# Patient Record
Sex: FEMALE | Race: WHITE | NOT HISPANIC OR LATINO
[De-identification: names, ages, dates, MRNs, and addresses within clinical notes are randomized per-mention and may not be internally consistent; named-entity substitution may affect disease eponyms.]

---

## 2018-01-25 ENCOUNTER — APPOINTMENT (OUTPATIENT)
Dept: GASTROENTEROLOGY | Facility: CLINIC | Age: 43
End: 2018-01-25
Payer: COMMERCIAL

## 2018-01-25 VITALS
WEIGHT: 228 LBS | HEART RATE: 94 BPM | RESPIRATION RATE: 14 BRPM | HEIGHT: 64 IN | DIASTOLIC BLOOD PRESSURE: 126 MMHG | SYSTOLIC BLOOD PRESSURE: 183 MMHG | BODY MASS INDEX: 38.93 KG/M2

## 2018-01-25 DIAGNOSIS — K62.5 HEMORRHAGE OF ANUS AND RECTUM: ICD-10-CM

## 2018-01-25 DIAGNOSIS — Z78.9 OTHER SPECIFIED HEALTH STATUS: ICD-10-CM

## 2018-01-25 DIAGNOSIS — Z83.79 FAMILY HISTORY OF OTHER DISEASES OF THE DIGESTIVE SYSTEM: ICD-10-CM

## 2018-01-25 DIAGNOSIS — Z80.0 FAMILY HISTORY OF MALIGNANT NEOPLASM OF DIGESTIVE ORGANS: ICD-10-CM

## 2018-01-25 PROCEDURE — 99214 OFFICE O/P EST MOD 30 MIN: CPT

## 2018-01-25 PROCEDURE — 82272 OCCULT BLD FECES 1-3 TESTS: CPT

## 2018-01-26 LAB
ALBUMIN SERPL ELPH-MCNC: 4.4 G/DL
ALP BLD-CCNC: 74 U/L
ALT SERPL-CCNC: 15 U/L
ANION GAP SERPL CALC-SCNC: 17 MMOL/L
AST SERPL-CCNC: 13 U/L
BASOPHILS # BLD AUTO: 0.04 K/UL
BASOPHILS NFR BLD AUTO: 0.4 %
BILIRUB SERPL-MCNC: <0.2 MG/DL
BUN SERPL-MCNC: 10 MG/DL
CALCIUM SERPL-MCNC: 9.5 MG/DL
CHLORIDE SERPL-SCNC: 102 MMOL/L
CO2 SERPL-SCNC: 22 MMOL/L
CREAT SERPL-MCNC: 0.83 MG/DL
EOSINOPHIL # BLD AUTO: 0.08 K/UL
EOSINOPHIL NFR BLD AUTO: 0.9 %
GLUCOSE SERPL-MCNC: 82 MG/DL
HCT VFR BLD CALC: 41.7 %
HGB BLD-MCNC: 13.5 G/DL
IMM GRANULOCYTES NFR BLD AUTO: 0.3 %
LYMPHOCYTES # BLD AUTO: 2.59 K/UL
LYMPHOCYTES NFR BLD AUTO: 28.2 %
MAN DIFF?: NORMAL
MCHC RBC-ENTMCNC: 29.2 PG
MCHC RBC-ENTMCNC: 32.4 GM/DL
MCV RBC AUTO: 90.3 FL
MONOCYTES # BLD AUTO: 0.68 K/UL
MONOCYTES NFR BLD AUTO: 7.4 %
NEUTROPHILS # BLD AUTO: 5.75 K/UL
NEUTROPHILS NFR BLD AUTO: 62.8 %
PLATELET # BLD AUTO: 351 K/UL
POTASSIUM SERPL-SCNC: 3.9 MMOL/L
PROT SERPL-MCNC: 7.1 G/DL
RBC # BLD: 4.62 M/UL
RBC # FLD: 13.4 %
SODIUM SERPL-SCNC: 141 MMOL/L
WBC # FLD AUTO: 9.17 K/UL

## 2018-02-15 ENCOUNTER — APPOINTMENT (OUTPATIENT)
Dept: GASTROENTEROLOGY | Facility: GI CENTER | Age: 43
End: 2018-02-15
Payer: COMMERCIAL

## 2018-02-15 ENCOUNTER — OUTPATIENT (OUTPATIENT)
Dept: OUTPATIENT SERVICES | Facility: HOSPITAL | Age: 43
LOS: 1 days | End: 2018-02-15
Payer: COMMERCIAL

## 2018-02-15 DIAGNOSIS — Z80.0 FAMILY HISTORY OF MALIGNANT NEOPLASM OF DIGESTIVE ORGANS: ICD-10-CM

## 2018-02-15 PROCEDURE — 45378 DIAGNOSTIC COLONOSCOPY: CPT

## 2018-07-27 PROBLEM — Z80.0 FAMILY HISTORY OF COLON CANCER: Status: ACTIVE | Noted: 2018-01-25

## 2018-09-07 ENCOUNTER — APPOINTMENT (OUTPATIENT)
Dept: CARDIOLOGY | Facility: CLINIC | Age: 43
End: 2018-09-07
Payer: COMMERCIAL

## 2018-09-07 VITALS — SYSTOLIC BLOOD PRESSURE: 152 MMHG | DIASTOLIC BLOOD PRESSURE: 100 MMHG

## 2018-09-07 VITALS
SYSTOLIC BLOOD PRESSURE: 134 MMHG | HEART RATE: 92 BPM | BODY MASS INDEX: 40.63 KG/M2 | HEIGHT: 64 IN | RESPIRATION RATE: 16 BRPM | DIASTOLIC BLOOD PRESSURE: 80 MMHG | OXYGEN SATURATION: 97 % | WEIGHT: 238 LBS

## 2018-09-07 VITALS — DIASTOLIC BLOOD PRESSURE: 98 MMHG | SYSTOLIC BLOOD PRESSURE: 149 MMHG

## 2018-09-07 DIAGNOSIS — Z78.9 OTHER SPECIFIED HEALTH STATUS: ICD-10-CM

## 2018-09-07 DIAGNOSIS — G45.3 AMAUROSIS FUGAX: ICD-10-CM

## 2018-09-07 PROCEDURE — 93000 ELECTROCARDIOGRAM COMPLETE: CPT

## 2018-09-07 PROCEDURE — 99205 OFFICE O/P NEW HI 60 MIN: CPT

## 2018-09-10 ENCOUNTER — RESULT CHARGE (OUTPATIENT)
Age: 43
End: 2018-09-10

## 2018-09-10 LAB
ALBUMIN SERPL ELPH-MCNC: 4 G/DL
ALP BLD-CCNC: 64 U/L
ALT SERPL-CCNC: 18 U/L
ANION GAP SERPL CALC-SCNC: 15 MMOL/L
APO B SERPL-MCNC: 98.8 MG/DL
AST SERPL-CCNC: 14 U/L
BILIRUB SERPL-MCNC: 0.2 MG/DL
BUN SERPL-MCNC: 11 MG/DL
CALCIUM SERPL-MCNC: 9.3 MG/DL
CHLORIDE SERPL-SCNC: 105 MMOL/L
CHOLEST SERPL-MCNC: 180 MG/DL
CHOLEST/HDLC SERPL: 5.1 RATIO
CO2 SERPL-SCNC: 21 MMOL/L
CREAT SERPL-MCNC: 0.64 MG/DL
GLUCOSE SERPL-MCNC: 116 MG/DL
HDLC SERPL-MCNC: 35 MG/DL
LDLC SERPL CALC-MCNC: 112 MG/DL
POTASSIUM SERPL-SCNC: 3.8 MMOL/L
PROT SERPL-MCNC: 6.8 G/DL
SODIUM SERPL-SCNC: 141 MMOL/L
TRIGL SERPL-MCNC: 163 MG/DL
TSH SERPL-ACNC: 1.6 UIU/ML

## 2018-09-12 LAB — APO LP(A) SERPL-MCNC: <10 NMOL/L

## 2018-09-18 ENCOUNTER — APPOINTMENT (OUTPATIENT)
Dept: CARDIOLOGY | Facility: CLINIC | Age: 43
End: 2018-09-18
Payer: COMMERCIAL

## 2018-09-18 PROCEDURE — 93970 EXTREMITY STUDY: CPT

## 2018-09-18 PROCEDURE — 93880 EXTRACRANIAL BILAT STUDY: CPT

## 2018-09-18 PROCEDURE — 93306 TTE W/DOPPLER COMPLETE: CPT

## 2018-09-19 ENCOUNTER — APPOINTMENT (OUTPATIENT)
Dept: CARDIOLOGY | Facility: CLINIC | Age: 43
End: 2018-09-19
Payer: COMMERCIAL

## 2018-09-19 VITALS
HEIGHT: 64 IN | HEART RATE: 77 BPM | DIASTOLIC BLOOD PRESSURE: 77 MMHG | BODY MASS INDEX: 40.63 KG/M2 | OXYGEN SATURATION: 94 % | WEIGHT: 238 LBS | SYSTOLIC BLOOD PRESSURE: 113 MMHG

## 2018-09-19 PROCEDURE — 99214 OFFICE O/P EST MOD 30 MIN: CPT

## 2018-10-17 ENCOUNTER — APPOINTMENT (OUTPATIENT)
Dept: CARDIOLOGY | Facility: CLINIC | Age: 43
End: 2018-10-17

## 2018-10-17 ENCOUNTER — APPOINTMENT (OUTPATIENT)
Dept: CARDIOLOGY | Facility: CLINIC | Age: 43
End: 2018-10-17
Payer: COMMERCIAL

## 2018-10-17 ENCOUNTER — NON-APPOINTMENT (OUTPATIENT)
Age: 43
End: 2018-10-17

## 2018-10-17 VITALS
WEIGHT: 238 LBS | HEART RATE: 87 BPM | BODY MASS INDEX: 40.63 KG/M2 | DIASTOLIC BLOOD PRESSURE: 82 MMHG | OXYGEN SATURATION: 96 % | SYSTOLIC BLOOD PRESSURE: 132 MMHG | HEIGHT: 64 IN

## 2018-10-17 PROCEDURE — 99215 OFFICE O/P EST HI 40 MIN: CPT

## 2018-10-17 PROCEDURE — G0447 BEHAVIOR COUNSEL OBESITY 15M: CPT | Mod: XU

## 2018-10-17 PROCEDURE — 93000 ELECTROCARDIOGRAM COMPLETE: CPT

## 2018-10-26 ENCOUNTER — APPOINTMENT (OUTPATIENT)
Dept: CARDIOLOGY | Facility: CLINIC | Age: 43
End: 2018-10-26
Payer: COMMERCIAL

## 2018-10-26 PROCEDURE — 93015 CV STRESS TEST SUPVJ I&R: CPT

## 2018-12-19 ENCOUNTER — APPOINTMENT (OUTPATIENT)
Dept: CARDIOLOGY | Facility: CLINIC | Age: 43
End: 2018-12-19

## 2019-01-07 ENCOUNTER — MEDICATION RENEWAL (OUTPATIENT)
Age: 44
End: 2019-01-07

## 2019-01-30 ENCOUNTER — APPOINTMENT (OUTPATIENT)
Dept: CARDIOLOGY | Facility: CLINIC | Age: 44
End: 2019-01-30
Payer: COMMERCIAL

## 2019-01-30 VITALS
HEART RATE: 84 BPM | WEIGHT: 238 LBS | BODY MASS INDEX: 40.63 KG/M2 | HEIGHT: 64 IN | OXYGEN SATURATION: 97 % | SYSTOLIC BLOOD PRESSURE: 118 MMHG | DIASTOLIC BLOOD PRESSURE: 83 MMHG

## 2019-01-30 DIAGNOSIS — M79.89 OTHER SPECIFIED SOFT TISSUE DISORDERS: ICD-10-CM

## 2019-01-30 PROCEDURE — 93000 ELECTROCARDIOGRAM COMPLETE: CPT

## 2019-01-30 PROCEDURE — 99215 OFFICE O/P EST HI 40 MIN: CPT

## 2019-01-30 NOTE — DISCUSSION/SUMMARY
[Patient] : the patient [Risks] : risks [Benefits] : benefits [Alternatives] : alternatives [___ Month(s)] : [unfilled] month(s) [With Me] : with me [FreeTextEntry1] : This is a 43 year old woman withe headaches, left arm numbness with dyspnea on exertion\par 1) hypertension controled.   ct norvasc 5, valsartan 160 and HCTZ 25 mg daily.  \par 2) dyspnea on exertion: Stress echo. 2d echo. BP contol\par 3) Left arm numbness: Carotid US. mild atheroscelrosis. Diet and exercise Bp control. \par 4) LEft leg cramping: US venous.\par 5) LDL : 112.  Hold choleterol meds. Exercise diet. stop Atorvastatin. \par 6) venous insufficiency:  Reflux in legs patient stands for long hours. States her job makes her stand. \par compression stockings not covered by insurance. \par \par 6) Intensive behavioral counselling performed. Calculated BMI =  41 kg/msq.  TIME spent > 15 mins. Diet assessment shows high calorie  and fat intake. Intensive behavioral counselling and behavior therapy performed for diet and exercise.  Exercise prescription given. Encouraged to join fitness center. Cardiac rehab offered/not covered.   Nutriton referral given. Offered weight loss medication.  ordered belviq 10 mg Q12. \par Assessed risk behaviors.  Explained about effects of such behavior on cardiac health and weight. Information and prescription handed in paper. Patient agrees for nutritional counselling and to establish a diet plan and scheduled exercise. Mental Based Stress reduction training given to reduce stress.  will follow up next visit on progress.

## 2019-01-30 NOTE — PHYSICAL EXAM
[General Appearance - Well Developed] : well developed [Normal Appearance] : normal appearance [Well Groomed] : well groomed [General Appearance - Well Nourished] : well nourished [No Deformities] : no deformities [General Appearance - In No Acute Distress] : no acute distress [Normal Conjunctiva] : the conjunctiva exhibited no abnormalities [Eyelids - No Xanthelasma] : the eyelids demonstrated no xanthelasmas [Normal Oral Mucosa] : normal oral mucosa [No Oral Pallor] : no oral pallor [No Oral Cyanosis] : no oral cyanosis [Normal Jugular Venous A Waves Present] : normal jugular venous A waves present [Normal Jugular Venous V Waves Present] : normal jugular venous V waves present [No Jugular Venous Rose A Waves] : no jugular venous rose A waves [Respiration, Rhythm And Depth] : normal respiratory rhythm and effort [Exaggerated Use Of Accessory Muscles For Inspiration] : no accessory muscle use [Auscultation Breath Sounds / Voice Sounds] : lungs were clear to auscultation bilaterally [Heart Rate And Rhythm] : heart rate and rhythm were normal [Heart Sounds] : normal S1 and S2 [Murmurs] : no murmurs present [Abdomen Soft] : soft [Abdomen Tenderness] : non-tender [Abdomen Mass (___ Cm)] : no abdominal mass palpated [Abnormal Walk] : normal gait [Gait - Sufficient For Exercise Testing] : the gait was sufficient for exercise testing [Nail Clubbing] : no clubbing of the fingernails [Cyanosis, Localized] : no localized cyanosis [Petechial Hemorrhages (___cm)] : no petechial hemorrhages [Skin Color & Pigmentation] : normal skin color and pigmentation [] : no rash [No Venous Stasis] : no venous stasis [Skin Lesions] : no skin lesions [No Skin Ulcers] : no skin ulcer [No Xanthoma] : no  xanthoma was observed [Oriented To Time, Place, And Person] : oriented to person, place, and time [Affect] : the affect was normal [Mood] : the mood was normal [No Anxiety] : not feeling anxious

## 2019-01-30 NOTE — REASON FOR VISIT
[Initial Evaluation] : an initial evaluation of [FreeTextEntry2] : dyspnea on exertion, left leg cramp, and difficulty in vision [FreeTextEntry1] : dyspnea on exertion, left leg cramp, and difficulty in vision

## 2019-01-30 NOTE — CARDIOLOGY SUMMARY
[No Ischemia] : no Ischemia [No Exercise Ind Arr] : no exercise induced arrhythmias [No Symptoms] : no Symptoms [___] : [unfilled] [LVEF ___%] : LVEF [unfilled]% [Normal] : normal LA size [None] : no mitral regurgitation [de-identified] : US carotid: midl atherosclerosis. no stenosis

## 2019-01-30 NOTE — REVIEW OF SYSTEMS
[Fever] : no fever [Headache] : headache [Chills] : no chills [Feeling Fatigued] : feeling fatigued [see HPI] : see HPI [Blurry Vision] : blurred vision [Seeing Double (Diplopia)] : no diplopia [Eye Pain] : eye pain [Eyeglasses] : not currently wearing eyeglasses [Shortness Of Breath] : shortness of breath [Dyspnea on exertion] : dyspnea during exertion [Chest  Pressure] : no chest pressure [Chest Pain] : no chest pain [Lower Ext Edema] : no extremity edema [Leg Claudication] : no intermittent leg claudication [Palpitations] : no palpitations [Cough] : no cough [Abdominal Pain] : no abdominal pain [Nausea] : no nausea [Vomiting] : no vomiting [Heartburn] : no heartburn [Change in Appetite] : no change in appetite [Change In The Stool] : no change in stool [Dysphagia] : no dysphagia [Dysuria] : no dysuria [Itching] : no itching [Dizziness] : dizziness [Tremor] : no tremor was seen [Memory Lapses Or Loss] : no memory lapses or loss [Negative] : Heme/Lymph

## 2019-01-30 NOTE — HISTORY OF PRESENT ILLNESS
[FreeTextEntry1] : dyspnea on exertion, left leg cramp, and difficulty in vision\par  patient dd not lose weight\par she feels better with BP meds. migraine headches.  headaches improved significantly. \par \par \par \par old note: fels ok. No chest pain. fatigue and headaches gone. Saw the result of venous doppler. Reflux in leg.s p[atient stands for long houors. 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 compression stockins \par \par old note: this is a 43 year old woman with left side numbness and heaviness and dyspnea one xertion.\par Patient states she has had elevated blood pressure in [facility] and other physicians.  She never took medications. She thought it was stress.  last few mths she has been having high blood pressure and not feeling right. \par She has been having fatigue and tiredness. she has been having headaches. she said one episode of left leg cramping. Also she has been having vision problems now last 3 mths.  \par the other day on Wednesdays she was at the beach and she had sudden onset of left side numbness and heaviness and pain in the legs and numbness. \par She has been having dyspnea on exertion for a while. going up the stairs and sweating. \par

## 2019-01-31 ENCOUNTER — OTHER (OUTPATIENT)
Age: 44
End: 2019-01-31

## 2019-03-06 ENCOUNTER — OTHER (OUTPATIENT)
Age: 44
End: 2019-03-06

## 2019-03-06 ENCOUNTER — CLINICAL ADVICE (OUTPATIENT)
Age: 44
End: 2019-03-06

## 2019-06-05 ENCOUNTER — NON-APPOINTMENT (OUTPATIENT)
Age: 44
End: 2019-06-05

## 2019-06-05 ENCOUNTER — APPOINTMENT (OUTPATIENT)
Dept: CARDIOLOGY | Facility: CLINIC | Age: 44
End: 2019-06-05
Payer: COMMERCIAL

## 2019-06-05 VITALS
HEART RATE: 92 BPM | DIASTOLIC BLOOD PRESSURE: 79 MMHG | SYSTOLIC BLOOD PRESSURE: 125 MMHG | OXYGEN SATURATION: 98 % | HEIGHT: 64 IN

## 2019-06-05 DIAGNOSIS — R12 HEARTBURN: ICD-10-CM

## 2019-06-05 PROCEDURE — 99215 OFFICE O/P EST HI 40 MIN: CPT

## 2019-06-05 PROCEDURE — 93000 ELECTROCARDIOGRAM COMPLETE: CPT

## 2019-06-05 NOTE — REVIEW OF SYSTEMS
[Headache] : headache [Fever] : no fever [Feeling Fatigued] : feeling fatigued [Chills] : no chills [see HPI] : see HPI [Blurry Vision] : blurred vision [Eye Pain] : eye pain [Seeing Double (Diplopia)] : no diplopia [Eyeglasses] : not currently wearing eyeglasses [Dyspnea on exertion] : dyspnea during exertion [Shortness Of Breath] : shortness of breath [Chest Pain] : no chest pain [Chest  Pressure] : no chest pressure [Leg Claudication] : no intermittent leg claudication [Lower Ext Edema] : no extremity edema [Abdominal Pain] : no abdominal pain [Palpitations] : no palpitations [Cough] : no cough [Nausea] : no nausea [Vomiting] : no vomiting [Heartburn] : no heartburn [Change in Appetite] : no change in appetite [Dysphagia] : no dysphagia [Change In The Stool] : no change in stool [Dizziness] : dizziness [Dysuria] : no dysuria [Itching] : no itching [Tremor] : no tremor was seen [Memory Lapses Or Loss] : no memory lapses or loss [Negative] : Heme/Lymph

## 2019-06-05 NOTE — CARDIOLOGY SUMMARY
[No Ischemia] : no Ischemia [No Symptoms] : no Symptoms [No Exercise Ind Arr] : no exercise induced arrhythmias [___] : [unfilled] [LVEF ___%] : LVEF [unfilled]% [Normal] : normal LA size [None] : no mitral regurgitation [de-identified] : US carotid: midl atherosclerosis. no stenosis

## 2019-06-05 NOTE — PHYSICAL EXAM
[General Appearance - Well Developed] : well developed [Well Groomed] : well groomed [Normal Appearance] : normal appearance [General Appearance - Well Nourished] : well nourished [No Deformities] : no deformities [General Appearance - In No Acute Distress] : no acute distress [Normal Conjunctiva] : the conjunctiva exhibited no abnormalities [Eyelids - No Xanthelasma] : the eyelids demonstrated no xanthelasmas [Normal Oral Mucosa] : normal oral mucosa [No Oral Pallor] : no oral pallor [No Oral Cyanosis] : no oral cyanosis [Normal Jugular Venous A Waves Present] : normal jugular venous A waves present [Normal Jugular Venous V Waves Present] : normal jugular venous V waves present [No Jugular Venous Rose A Waves] : no jugular venous rose A waves [Exaggerated Use Of Accessory Muscles For Inspiration] : no accessory muscle use [Respiration, Rhythm And Depth] : normal respiratory rhythm and effort [Heart Rate And Rhythm] : heart rate and rhythm were normal [Auscultation Breath Sounds / Voice Sounds] : lungs were clear to auscultation bilaterally [Murmurs] : no murmurs present [Heart Sounds] : normal S1 and S2 [Abdomen Soft] : soft [Abdomen Mass (___ Cm)] : no abdominal mass palpated [Abdomen Tenderness] : non-tender [Gait - Sufficient For Exercise Testing] : the gait was sufficient for exercise testing [Abnormal Walk] : normal gait [Petechial Hemorrhages (___cm)] : no petechial hemorrhages [Cyanosis, Localized] : no localized cyanosis [Nail Clubbing] : no clubbing of the fingernails [Skin Color & Pigmentation] : normal skin color and pigmentation [] : no ischemic changes [Skin Lesions] : no skin lesions [No Venous Stasis] : no venous stasis [No Xanthoma] : no  xanthoma was observed [No Skin Ulcers] : no skin ulcer [Oriented To Time, Place, And Person] : oriented to person, place, and time [Affect] : the affect was normal [No Anxiety] : not feeling anxious [Mood] : the mood was normal

## 2019-06-05 NOTE — HISTORY OF PRESENT ILLNESS
[FreeTextEntry1] : dyspnea on exertion, left leg cramp, and difficulty in vision\par \par state her varicose veins of right leg burst. she c/o pulsatile mass on the leg that is intermittent. \par she has chest pain intermittent. not on exertion.\par Also has some heart burn. \par \par old note:  patient dd not lose weight\par she feels better with BP meds. migraine headches.  headaches improved significantly. \par \par \par \par old note: fels ok. No chest pain. fatigue and headaches gone. Saw the result of venous doppler. Reflux in leg.s p[atient stands for long houors. 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  She never took medications. She thought it was stress.  last few mths she has been having high blood pressure and not feeling right. \par She has been having fatigue and tiredness. she has been having headaches. she said one episode of left leg cramping. Also she has been having vision problems now last 3 mths.  \par the other day on Wednesdays she was at the beach and she had sudden onset of left side numbness and heaviness and pain in the legs and numbness. \par She has been having dyspnea on exertion for a while. going up the stairs and sweating. \par

## 2019-06-05 NOTE — DISCUSSION/SUMMARY
[Patient] : the patient [Risks] : risks [Benefits] : benefits [___ Month(s)] : [unfilled] month(s) [Alternatives] : alternatives [With Me] : with me [FreeTextEntry1] : This is a 43 year old woman withe headaches, left arm numbness with dyspnea on exertion\par 1) chest pain: recent stress test. no ischemia. Likely heart burn. protonix 40 mg daily before break fast. if continues to have symptoms, then repeat stress test. \par 1) hypertension controled.   ct norvasc 5, valsartan 160 and HCTZ 25 mg daily.  \par 2) dyspnea on exertion: Stress echo. 2d echo. BP contol\par 3) Left arm numbness: Carotid US. mild atheroscelrosis. Diet and exercise Bp control. \par 4) LDL : 112.  Hold choleterol meds. Exercise diet. stop Atorvastatin. \par 5) venous insufficiency:  Reflux in legs patient stands for long hours. States her job makes her stand. \par compression stockings not covered by insurance. \par 6) Intensive behavioral counselling performed. Calculated BMI =  41 kg/msq.  TIME spent > 15 mins. she did not take belviq.

## 2019-06-20 ENCOUNTER — RX CHANGE (OUTPATIENT)
Age: 44
End: 2019-06-20

## 2019-12-11 ENCOUNTER — NON-APPOINTMENT (OUTPATIENT)
Age: 44
End: 2019-12-11

## 2019-12-11 ENCOUNTER — APPOINTMENT (OUTPATIENT)
Dept: CARDIOLOGY | Facility: CLINIC | Age: 44
End: 2019-12-11
Payer: COMMERCIAL

## 2019-12-11 VITALS
SYSTOLIC BLOOD PRESSURE: 143 MMHG | DIASTOLIC BLOOD PRESSURE: 83 MMHG | HEART RATE: 91 BPM | OXYGEN SATURATION: 96 % | RESPIRATION RATE: 16 BRPM

## 2019-12-11 VITALS — HEIGHT: 64 IN | BODY MASS INDEX: 40.97 KG/M2 | WEIGHT: 240 LBS

## 2019-12-11 DIAGNOSIS — F32.9 MAJOR DEPRESSIVE DISORDER, SINGLE EPISODE, UNSPECIFIED: ICD-10-CM

## 2019-12-11 PROCEDURE — 93000 ELECTROCARDIOGRAM COMPLETE: CPT

## 2019-12-11 PROCEDURE — 99215 OFFICE O/P EST HI 40 MIN: CPT | Mod: 25

## 2019-12-11 PROCEDURE — 99401 PREV MED CNSL INDIV APPRX 15: CPT

## 2019-12-11 RX ORDER — LORCASERIN HYDROCHLORIDE HEMIHYDRATE 10 MG/1
10 TABLET ORAL TWICE DAILY
Qty: 60 | Refills: 0 | Status: DISCONTINUED | COMMUNITY
Start: 2019-01-30 | End: 2019-12-11

## 2019-12-11 RX ORDER — HYDROCORTISONE 25 MG/G
2.5 CREAM TOPICAL TWICE DAILY
Qty: 30 | Refills: 3 | Status: DISCONTINUED | COMMUNITY
Start: 2018-02-15 | End: 2019-12-11

## 2019-12-11 RX ORDER — ATORVASTATIN CALCIUM 10 MG/1
10 TABLET, FILM COATED ORAL
Qty: 30 | Refills: 5 | Status: DISCONTINUED | COMMUNITY
Start: 2018-09-07 | End: 2019-12-11

## 2019-12-11 RX ORDER — IBUPROFEN 200 MG/1
200 TABLET, FILM COATED ORAL EVERY 6 HOURS
Refills: 0 | Status: DISCONTINUED | COMMUNITY
End: 2019-12-11

## 2019-12-11 RX ORDER — PANTOPRAZOLE 40 MG/1
40 TABLET, DELAYED RELEASE ORAL DAILY
Qty: 30 | Refills: 1 | Status: DISCONTINUED | COMMUNITY
Start: 2019-06-05 | End: 2019-12-11

## 2019-12-11 RX ORDER — HYDROCORTISONE ACETATE 25 MG/1
25 SUPPOSITORY RECTAL TWICE DAILY
Qty: 60 | Refills: 3 | Status: DISCONTINUED | COMMUNITY
Start: 2018-01-25 | End: 2019-12-11

## 2019-12-11 NOTE — PHYSICAL EXAM
[Normal Appearance] : normal appearance [General Appearance - Well Developed] : well developed [Well Groomed] : well groomed [General Appearance - Well Nourished] : well nourished [No Deformities] : no deformities [General Appearance - In No Acute Distress] : no acute distress [Normal Conjunctiva] : the conjunctiva exhibited no abnormalities [No Oral Pallor] : no oral pallor [Normal Oral Mucosa] : normal oral mucosa [Eyelids - No Xanthelasma] : the eyelids demonstrated no xanthelasmas [Normal Jugular Venous V Waves Present] : normal jugular venous V waves present [Normal Jugular Venous A Waves Present] : normal jugular venous A waves present [No Oral Cyanosis] : no oral cyanosis [No Jugular Venous Rose A Waves] : no jugular venous rose A waves [Respiration, Rhythm And Depth] : normal respiratory rhythm and effort [Heart Rate And Rhythm] : heart rate and rhythm were normal [Auscultation Breath Sounds / Voice Sounds] : lungs were clear to auscultation bilaterally [Exaggerated Use Of Accessory Muscles For Inspiration] : no accessory muscle use [Heart Sounds] : normal S1 and S2 [Abdomen Soft] : soft [Murmurs] : no murmurs present [Abdomen Tenderness] : non-tender [Abdomen Mass (___ Cm)] : no abdominal mass palpated [Abnormal Walk] : normal gait [Nail Clubbing] : no clubbing of the fingernails [Cyanosis, Localized] : no localized cyanosis [Gait - Sufficient For Exercise Testing] : the gait was sufficient for exercise testing [Skin Color & Pigmentation] : normal skin color and pigmentation [Petechial Hemorrhages (___cm)] : no petechial hemorrhages [Skin Lesions] : no skin lesions [] : no rash [No Venous Stasis] : no venous stasis [No Xanthoma] : no  xanthoma was observed [No Skin Ulcers] : no skin ulcer [Oriented To Time, Place, And Person] : oriented to person, place, and time [Mood] : the mood was normal [Affect] : the affect was normal [No Anxiety] : not feeling anxious

## 2019-12-11 NOTE — DISCUSSION/SUMMARY
[Risks] : risks [Patient] : the patient [Alternatives] : alternatives [___ Month(s)] : [unfilled] month(s) [Benefits] : benefits [With Me] : with me [FreeTextEntry1] : This is a 43 year old woman withe headaches, left arm numbness with dyspnea on exertion\par 1) chest pain: recent stress test. no ischemia. Likely heart burn. protonix 40 mg daily before break fast. if continues to have symptoms, then repeat stress test. \par 1) hypertension controled.   ct norvasc 5, valsartan 160 and HCTZ 25 mg daily.  \par 2) dyspnea on exertion: Stress echo. 2d echo. BP contol\par 3) Left arm numbness: Carotid US. mild atheroscelrosis. Diet and exercise Bp control. \par 4) LDL : 112.  Hold choleterol meds. Exercise diet. stop Atorvastatin. \par 5) venous insufficiency:  Reflux in legs  \par compression stockings not covered by insurance. \par 6) Intensive behavioral counselling performed. Calculated BMI =  41 kg/msq.  TIME spent > 15 mins. she did not take belviq. Intensive behavioral counselling performed.    Diet assessment shows high calorie  and fat intake. Intensive behavioral counselling and behavior therapy performed for diet and exercise.  Exercise prescription given. Encouraged to join fitness center.     Nutriton referral given. Offered weight loss medication.  Qsmia (Phentermine/topiramate)/ Belviq (Locaserin), Contrave (naltrexone/bupropion)  or victoza (liraglutide- DM med)\par Assessed risk behaviors.  Explained about effects of such behavior on cardiac health and weight. Information and prescription handed in paper. Patient agrees for nutritional counselling and to establish a diet plan and scheduled exercise. Mental Based Stress reduction training given to reduce stress.  will follow up next visit on progress. \par patient does not want to take meds. Patient does not want to seek bariatric surgery. she does not want to take weight loss meds. \par we discussed all options. suggest hypnotherapy. will try metformin,.

## 2019-12-11 NOTE — REVIEW OF SYSTEMS
[Headache] : headache [see HPI] : see HPI [Feeling Fatigued] : feeling fatigued [Blurry Vision] : blurred vision [Eye Pain] : eye pain [Dyspnea on exertion] : dyspnea during exertion [Shortness Of Breath] : shortness of breath [Dizziness] : dizziness [Negative] : Heme/Lymph [Fever] : no fever [Eyeglasses] : not currently wearing eyeglasses [Seeing Double (Diplopia)] : no diplopia [Chills] : no chills [Lower Ext Edema] : no extremity edema [Chest Pain] : no chest pain [Chest  Pressure] : no chest pressure [Palpitations] : no palpitations [Leg Claudication] : no intermittent leg claudication [Cough] : no cough [Nausea] : no nausea [Abdominal Pain] : no abdominal pain [Vomiting] : no vomiting [Change In The Stool] : no change in stool [Change in Appetite] : no change in appetite [Heartburn] : no heartburn [Dysphagia] : no dysphagia [Itching] : no itching [Dysuria] : no dysuria [Tremor] : no tremor was seen [Memory Lapses Or Loss] : no memory lapses or loss

## 2019-12-11 NOTE — CARDIOLOGY SUMMARY
[No Ischemia] : no Ischemia [No Exercise Ind Arr] : no exercise induced arrhythmias [No Symptoms] : no Symptoms [___] : [unfilled] [LVEF ___%] : LVEF [unfilled]% [Normal] : normal LA size [None] : no mitral regurgitation [de-identified] : US carotid: midl atherosclerosis. no stenosis

## 2019-12-11 NOTE — HISTORY OF PRESENT ILLNESS
[FreeTextEntry1] : dyspnea on exertion, left leg cramp, and difficulty in vision\par \par HPI for today: : gained weight.  no chest pain. no headaches. no dizziness. no chest pain and no dyspnea.  no palpitations. \par \par old note: state her varicose veins of right leg burst. she c/o pulsatile mass on the leg that is intermittent. \par she has chest pain intermittent. not on exertion.\par Also has some heart burn. \par \par old note:  patient dd not lose weight\par she feels better with BP meds. migraine headches.  headaches improved significantly. \par \par \par \par

## 2020-01-02 ENCOUNTER — MEDICATION RENEWAL (OUTPATIENT)
Age: 45
End: 2020-01-02

## 2020-01-03 ENCOUNTER — RX CHANGE (OUTPATIENT)
Age: 45
End: 2020-01-03

## 2020-07-22 ENCOUNTER — APPOINTMENT (OUTPATIENT)
Dept: CARDIOLOGY | Facility: CLINIC | Age: 45
End: 2020-07-22
Payer: COMMERCIAL

## 2020-07-22 VITALS
HEART RATE: 91 BPM | SYSTOLIC BLOOD PRESSURE: 118 MMHG | TEMPERATURE: 97.6 F | DIASTOLIC BLOOD PRESSURE: 79 MMHG | WEIGHT: 235 LBS | OXYGEN SATURATION: 96 % | BODY MASS INDEX: 40.34 KG/M2

## 2020-07-22 DIAGNOSIS — I87.2 VENOUS INSUFFICIENCY (CHRONIC) (PERIPHERAL): ICD-10-CM

## 2020-07-22 DIAGNOSIS — R40.0 SOMNOLENCE: ICD-10-CM

## 2020-07-22 PROCEDURE — 93000 ELECTROCARDIOGRAM COMPLETE: CPT

## 2020-07-22 PROCEDURE — 99214 OFFICE O/P EST MOD 30 MIN: CPT

## 2020-07-28 ENCOUNTER — NON-APPOINTMENT (OUTPATIENT)
Age: 45
End: 2020-07-28

## 2020-08-14 ENCOUNTER — NON-APPOINTMENT (OUTPATIENT)
Age: 45
End: 2020-08-14

## 2020-08-27 DIAGNOSIS — Z01.818 ENCOUNTER FOR OTHER PREPROCEDURAL EXAMINATION: ICD-10-CM

## 2020-08-29 ENCOUNTER — APPOINTMENT (OUTPATIENT)
Dept: DISASTER EMERGENCY | Facility: CLINIC | Age: 45
End: 2020-08-29

## 2020-09-01 ENCOUNTER — OUTPATIENT (OUTPATIENT)
Dept: OUTPATIENT SERVICES | Facility: HOSPITAL | Age: 45
LOS: 1 days | End: 2020-09-01
Payer: COMMERCIAL

## 2020-09-01 DIAGNOSIS — G47.33 OBSTRUCTIVE SLEEP APNEA (ADULT) (PEDIATRIC): ICD-10-CM

## 2020-09-01 PROCEDURE — 95806 SLEEP STUDY UNATT&RESP EFFT: CPT | Mod: 26

## 2020-09-01 PROCEDURE — 95806 SLEEP STUDY UNATT&RESP EFFT: CPT

## 2020-09-01 PROCEDURE — G0399: CPT

## 2020-10-29 ENCOUNTER — APPOINTMENT (OUTPATIENT)
Dept: PULMONOLOGY | Facility: CLINIC | Age: 45
End: 2020-10-29
Payer: COMMERCIAL

## 2020-10-29 VITALS
DIASTOLIC BLOOD PRESSURE: 80 MMHG | HEIGHT: 64.5 IN | BODY MASS INDEX: 40.47 KG/M2 | WEIGHT: 240 LBS | OXYGEN SATURATION: 98 % | HEART RATE: 84 BPM | SYSTOLIC BLOOD PRESSURE: 130 MMHG

## 2020-10-29 PROCEDURE — 99072 ADDL SUPL MATRL&STAF TM PHE: CPT

## 2020-10-29 PROCEDURE — 99204 OFFICE O/P NEW MOD 45 MIN: CPT | Mod: 25

## 2020-10-29 RX ORDER — METFORMIN HYDROCHLORIDE 500 MG/1
500 TABLET, COATED ORAL
Qty: 60 | Refills: 3 | Status: DISCONTINUED | COMMUNITY
Start: 2019-12-11 | End: 2020-10-29

## 2020-10-29 NOTE — HISTORY OF PRESENT ILLNESS
[Obstructive Sleep Apnea] : obstructive sleep apnea [Awakes Unrefreshed] : awakes unrefreshed [Awakes with Headache] : awakes with headache [Daytime Somnolence] : daytime somnolence [Hypersomnolence] : hypersomnolence [Snoring] : snoring [TextBox_77] : 9Pm [TextBox_79] : 9am [TextBox_81] : 60 [TextBox_93] : restless sleeper

## 2020-10-29 NOTE — DISCUSSION/SUMMARY
[Obstructive Sleep Apnea] : obstructive sleep apnea [Mild] : mild [CPAP Titration] : CPAP titration [None] : There are no changes in medication management [Alcohol Avoidance] : alcohol avoidance [Sedative Avoidance] : sedative avoidance [Weight Loss Program] : weight loss program [de-identified] : Possible OA. Pt disinterested in CPAP [de-identified] : The pathophysiology of sleep was explained to the patient in detail. Inclusive of this was the reasoning behind and the expected response to positive airway pressure therapy. Compliance was outlined including further followup

## 2020-11-11 ENCOUNTER — OUTPATIENT (OUTPATIENT)
Dept: OUTPATIENT SERVICES | Facility: HOSPITAL | Age: 45
LOS: 1 days | End: 2020-11-11
Payer: COMMERCIAL

## 2020-11-11 ENCOUNTER — APPOINTMENT (OUTPATIENT)
Dept: PHYSICAL MEDICINE AND REHAB | Facility: CLINIC | Age: 45
End: 2020-11-11
Payer: COMMERCIAL

## 2020-11-11 ENCOUNTER — APPOINTMENT (OUTPATIENT)
Dept: RADIOLOGY | Facility: CLINIC | Age: 45
End: 2020-11-11
Payer: COMMERCIAL

## 2020-11-11 VITALS
DIASTOLIC BLOOD PRESSURE: 89 MMHG | SYSTOLIC BLOOD PRESSURE: 135 MMHG | HEIGHT: 64.5 IN | HEART RATE: 89 BPM | TEMPERATURE: 96.4 F

## 2020-11-11 DIAGNOSIS — M77.11 LATERAL EPICONDYLITIS, RIGHT ELBOW: ICD-10-CM

## 2020-11-11 DIAGNOSIS — M25.521 PAIN IN RIGHT ELBOW: ICD-10-CM

## 2020-11-11 PROCEDURE — 73070 X-RAY EXAM OF ELBOW: CPT | Mod: 26,RT

## 2020-11-11 PROCEDURE — 73070 X-RAY EXAM OF ELBOW: CPT

## 2020-11-11 PROCEDURE — 99072 ADDL SUPL MATRL&STAF TM PHE: CPT

## 2020-11-11 PROCEDURE — 99204 OFFICE O/P NEW MOD 45 MIN: CPT

## 2020-11-11 NOTE — ASSESSMENT
[FreeTextEntry1] : Ms. VERONICA SOUZA is a 45 year old  female who presents with R elbow pain for 6+ months duration consistent with R lateral epicondylitis. At this time, will recommend a counterforce brace (patient to buy it on [employer]), a R elbow X-ray to rule out any bony abnormalities, Mobic 7.5 BID PRN, patient knows not to take it with food or other NSAIDs. Will see patient back in 4-5 weeks. If not improvement, can consider R lateral epicondyle steroid injection. Patient in agreement with plan.

## 2020-11-11 NOTE — PHYSICAL EXAM
[FreeTextEntry1] : PE:\par Constitutional: In NAD, calm and cooperative\par HEENT: NCAT, Anicteric sclera, no lid-lag\par Cardio: Extremities appear pink and well perfused, no peripheral edema\par Respiratory: Normal respiratory effort on room air, no accessory muscle use\par Skin: no rashes seen on exposed skin, no visible abrasions\par Psych: Normal affect, intact judgment and insight\par Neuro: Awake, alert and oriented x 3, see below for focused neurological exam\par MSK (R elbow):\par 	Inspection: no gross swelling identified\par 	Palpation: Tenderness over R lateral epicondyle\par 	ROM: Grossly intact AROM\par 	Strength: 5/5 strength in bilateral upper extremities\par 	Reflexes: 2+ Biceps/Brachioradialis reflex bilaterally, Pisano’s negative bilaterally\par 	Sensation: Intact to light touch in bilateral upper extremities\par Special tests: \par Cozen's test: positive on R

## 2020-11-11 NOTE — HISTORY OF PRESENT ILLNESS
[FreeTextEntry1] : Ms. VERONICA SOUZA  is a RHD 45 year old female who presents with right elbow pain. \par \par Location:R elbow\par Onset:Back in 4/2020 after she did some spring cleaning\par Provocation/Palliative:\par Quality:"Tender"\par Radiation:Down R elbow to R wrist\par Severity:7/10 but changes\par Timing:Not improving with time\par \par Denies any associated numbness. Denies any associated arm or hand weakness. Denies any loss of bowel/bladder control or any groin numbness.\par Previous medications trialed:Lidocaine patches, Advil, Tylenol with minimal relief\par Previous procedures relevant to complaint:None\par Has tried conservative treatment?:just NSAIDs

## 2021-02-24 ENCOUNTER — APPOINTMENT (OUTPATIENT)
Dept: CARDIOLOGY | Facility: CLINIC | Age: 46
End: 2021-02-24

## 2021-06-01 ENCOUNTER — RX RENEWAL (OUTPATIENT)
Age: 46
End: 2021-06-01

## 2021-06-03 RX ORDER — AMLODIPINE BESYLATE VALSARTAN HYDROCHLOROTHIAZIDE 5; 25; 160 MG/1; MG/1; MG/1
5-160-25 TABLET, FILM COATED ORAL DAILY
Qty: 90 | Refills: 3 | Status: DISCONTINUED | COMMUNITY
Start: 2018-09-07 | End: 2021-06-03

## 2021-06-22 ENCOUNTER — EMERGENCY (EMERGENCY)
Facility: HOSPITAL | Age: 46
LOS: 1 days | Discharge: HOME | End: 2021-06-22
Attending: EMERGENCY MEDICINE
Payer: COMMERCIAL

## 2021-06-22 ENCOUNTER — NON-APPOINTMENT (OUTPATIENT)
Age: 46
End: 2021-06-22

## 2021-06-22 VITALS
WEIGHT: 246.48 LBS | TEMPERATURE: 98 F | DIASTOLIC BLOOD PRESSURE: 84 MMHG | RESPIRATION RATE: 16 BRPM | SYSTOLIC BLOOD PRESSURE: 146 MMHG | HEIGHT: 64 IN | OXYGEN SATURATION: 97 % | HEART RATE: 81 BPM

## 2021-06-22 VITALS
DIASTOLIC BLOOD PRESSURE: 87 MMHG | HEART RATE: 76 BPM | RESPIRATION RATE: 14 BRPM | OXYGEN SATURATION: 97 % | SYSTOLIC BLOOD PRESSURE: 155 MMHG

## 2021-06-22 PROCEDURE — 99284 EMERGENCY DEPT VISIT MOD MDM: CPT | Mod: 25

## 2021-06-22 PROCEDURE — 93971 EXTREMITY STUDY: CPT

## 2021-06-22 PROCEDURE — 93971 EXTREMITY STUDY: CPT | Mod: 26,RT

## 2021-06-22 PROCEDURE — 99284 EMERGENCY DEPT VISIT MOD MDM: CPT

## 2021-06-22 RX ORDER — IBUPROFEN 200 MG
600 TABLET ORAL ONCE
Refills: 0 | Status: DISCONTINUED | OUTPATIENT
Start: 2021-06-22 | End: 2021-06-27

## 2021-06-22 NOTE — ED STATDOCS - PATIENT PORTAL LINK FT
You can access the FollowMyHealth Patient Portal offered by [facility] by registering at the following website: [email or web address]. By joining [employer]’s FollowMyHealth portal, you will also be able to view your health information using other applications (apps) compatible with our system.

## 2021-06-22 NOTE — ED STATDOCS - ATTENDING CONTRIBUTION TO CARE
I, Dr. Muniz, performed the initial face to face bedside interview with this patient regarding history of present illness, review of symptoms and relevant past medical, social and family history.  I completed an independent physical examination.  I was the initial provider who evaluated this patient. I have signed out the follow up of any pending tests (i.e. labs, radiological studies) to the ACP.  I have communicated the patient’s plan of care and disposition with the ACP.

## 2021-06-22 NOTE — ED STATDOCS - CLINICAL SUMMARY MEDICAL DECISION MAKING FREE TEXT BOX
46y female with pain in right lower extremity, increased size of veins in right lower leg. Will check US to rule out DVT and reassess.

## 2021-06-22 NOTE — ED ADULT TRIAGE NOTE - CHIEF COMPLAINT QUOTE
pt c/o right calf pain x 10 days. states worse behind knee. states comes and goes. sensation intact, able to bear weight. skin warm. no redness

## 2021-06-22 NOTE — ED STATDOCS - PROGRESS NOTE DETAILS
MARIANO MORRELL: PT evaluated by intake physician. HPI/PE/ROS as noted above. Will follow up plan per intake physician   10 days right leg pain   2+ DP sensation intact + lE varicosities no pitting edema. ambulatory with steady gait sensation intact advised on negative sono for DVT   advised on compression stocking and fu with pmd as well as strict return precautions

## 2021-06-22 NOTE — ED STATDOCS - NSFOLLOWUPINSTRUCTIONS_ED_ALL_ED_FT
reset elevate the leg   recommend compression socks or stockings   please follow with primary care doctor and bring all results with you

## 2021-06-22 NOTE — ED STATDOCS - OBJECTIVE STATEMENT
46y female with a PMHx of HTN presents to the ED c/o right lower leg pain onset 10x days. Pt reports when the pain began to occur, she noticed veins became more pronounced and torturous, and she reports "the veins don't feel normal, like they're twisted". Pt reports she feels like the blood flow isn't constant, and today while she was nervous that "her leg was going to explode," and her symptoms were exacerbated while standing and driving. Pt reports no relief from OTC medication.     Pt denies swelling, chest pain, shortness of breath,

## 2021-06-22 NOTE — ED STATDOCS - MUSCULOSKELETAL, MLM
(+)(+)torturous, superficial veins devoted to right lower extremity, mild calf tenderness. no pitting edema, both lower extremities symmetrical. range of motion is not limited

## 2021-07-29 ENCOUNTER — APPOINTMENT (OUTPATIENT)
Dept: OBGYN | Facility: CLINIC | Age: 46
End: 2021-07-29

## 2021-08-04 ENCOUNTER — APPOINTMENT (OUTPATIENT)
Dept: CARDIOLOGY | Facility: CLINIC | Age: 46
End: 2021-08-04
Payer: COMMERCIAL

## 2021-08-04 ENCOUNTER — NON-APPOINTMENT (OUTPATIENT)
Age: 46
End: 2021-08-04

## 2021-08-04 VITALS
SYSTOLIC BLOOD PRESSURE: 131 MMHG | DIASTOLIC BLOOD PRESSURE: 74 MMHG | OXYGEN SATURATION: 99 % | HEIGHT: 64.5 IN | TEMPERATURE: 97.9 F | HEART RATE: 81 BPM

## 2021-08-04 DIAGNOSIS — Z86.79 PERSONAL HISTORY OF OTHER DISEASES OF THE CIRCULATORY SYSTEM: ICD-10-CM

## 2021-08-04 DIAGNOSIS — M79.669 PAIN IN UNSPECIFIED LOWER LEG: ICD-10-CM

## 2021-08-04 DIAGNOSIS — I73.9 PERIPHERAL VASCULAR DISEASE, UNSPECIFIED: ICD-10-CM

## 2021-08-04 PROCEDURE — 99215 OFFICE O/P EST HI 40 MIN: CPT

## 2021-08-04 PROCEDURE — 93000 ELECTROCARDIOGRAM COMPLETE: CPT

## 2021-08-04 RX ORDER — MULTIVIT-MIN/IRON/FOLIC ACID/K 18-600-40
50 MCG CAPSULE ORAL DAILY
Qty: 30 | Refills: 3 | Status: DISCONTINUED | COMMUNITY
End: 2021-08-04

## 2021-08-04 RX ORDER — MELOXICAM 7.5 MG/1
7.5 TABLET ORAL DAILY
Qty: 60 | Refills: 0 | Status: DISCONTINUED | COMMUNITY
Start: 2020-11-11 | End: 2021-08-04

## 2021-08-12 NOTE — REASON FOR VISIT
[Initial Evaluation] : an initial evaluation of [FreeTextEntry1] : dyspnea on exertion, left leg cramp, and difficulty in vision [FreeTextEntry2] : dyspnea on exertion, left leg cramp, and difficulty in vision

## 2021-08-12 NOTE — HISTORY OF PRESENT ILLNESS
[FreeTextEntry1] : dyspnea on exertion, left leg cramp, and difficulty in vision\par \par HPI for today: feels good. had episode of cramps in the legs. pain while walking\par no dysopnea. no chest pain. \par \par old note: : gained weight.  no chest pain. no headaches. no dizziness. no chest pain and no dyspnea.  no palpitations. \par \par old note: state her varicose veins of right leg burst. she c/o pulsatile mass on the leg that is intermittent. \par she has chest pain intermittent. not on exertion.\par Also has some heart burn. \par \par old note:  patient dd not lose weight\par she feels better with BP meds. migraine headches.  headaches improved significantly. \par \par \par \par

## 2021-08-12 NOTE — CARDIOLOGY SUMMARY
[No Ischemia] : no Ischemia [No Exercise Ind Arr] : no exercise induced arrhythmias [No Symptoms] : no Symptoms [___] : [unfilled] [LVEF ___%] : LVEF [unfilled]% [Normal] : normal LA size [None] : no mitral regurgitation [de-identified] : 8/4/2021  Sinus  Rhythm \par  non specific ST T changes. \par   [de-identified] : US carotid: midl atherosclerosis. no stenosis

## 2021-08-12 NOTE — DISCUSSION/SUMMARY
[Patient] : the patient [Risks] : risks [Benefits] : benefits [Alternatives] : alternatives [With Me] : with me [___ Month(s)] : in [unfilled] month(s) [FreeTextEntry1] : This is a 43 year old woman withe headaches, left arm numbness with dyspnea on exertion\par 1) chest pain: recent stress test. no ischemia. Likely heart burn. protonix 40 mg daily before break fast. if continues to have symptoms, then repeat stress test. \par 1) hypertension controlled.   ct norvasc 5, valsartan 160 and HCTZ 25 mg daily.   2d echo. \par 2) dyspnea on exertion:  resolved.  \par 3) Left arm numbness: Carotid US. mild atherosclerosis. Diet and exercise Bp control. \par 4)     off  choleterol meds. Exercise diet.  \par 5) venous insufficiency:  Reflux in legs \par 6) Cramping in the legs:   . coq10 and magnesium. . compression stockings not covered by insurance. \par 6) Intensive behavioral counselling performed. Calculated BMI =  41 kg/msq.   \par az

## 2021-10-12 ENCOUNTER — APPOINTMENT (OUTPATIENT)
Dept: COLORECTAL SURGERY | Facility: CLINIC | Age: 46
End: 2021-10-12
Payer: COMMERCIAL

## 2021-10-12 PROCEDURE — 46320 REMOVAL OF HEMORRHOID CLOT: CPT

## 2021-10-12 PROCEDURE — 99204 OFFICE O/P NEW MOD 45 MIN: CPT | Mod: 25

## 2021-10-12 NOTE — HISTORY OF PRESENT ILLNESS
[FreeTextEntry1] : Ms. Melvin presents to the office for consultation regarding anorectal pain and a lump that has been present for the past week.  The lump appears to increase and decrease intermittently but the pain persists.  She admits to straining with stools.  Last colonoscopy was in 2018 and she is due for another one this year with Dr. Alcantara.

## 2021-10-12 NOTE — PHYSICAL EXAM
[Tender, Swollen] : tender, swollen [Thrombosed] : that was thrombosed [Normal] : was normal [No Rash or Lesion] : No rash or lesion [Alert] : alert [Oriented to Person] : oriented to person [Oriented to Place] : oriented to place [Oriented to Time] : oriented to time [Calm] : calm [de-identified] : right posterior [de-identified] : No apparent distress [de-identified] : Normocephalic atraumatic [de-identified] : Moving all extremities x4

## 2021-10-12 NOTE — ASSESSMENT
[FreeTextEntry1] : Ms Melvin presents to the office with a right posterior thrombosed external hemorrhoid. I have discussed the etiology for this condition including excess straining with activity or straining to evacuate stools secondary to constipation or diarrhea. In office today, we proceeded to excise the ALMA to good effect. The patient was advised on what to expect post procedure. This includes some mild discomfort which should be readily alleviated by over-the-counter pain medications. Sitz baths will need to be performed to keep the wound site clean to facilitate healing. There will be some mild serosanguineous drainage to be anticipated and this should resolve as the wound heals. Cotton gauze should be placed in undergarments to prevent soilage from drainage. A bowel regimen consisting of a high fiber diet, ample daily water intake and miralax as needed is recommended to prevent further episodes of straining and further episodes of thrombosis. She was also advised to avoid heavy lifting and straining for the next two days to prevent reaccumulation of thrombus.  Worsening bleeding from the site can be addressed by laying in the left lateral decubitus position while holding pressure at the site.\par \par Followup with Dr. Alcantara for surveillance colonoscopy.

## 2021-10-13 ENCOUNTER — APPOINTMENT (OUTPATIENT)
Dept: COLORECTAL SURGERY | Facility: CLINIC | Age: 46
End: 2021-10-13
Payer: COMMERCIAL

## 2021-10-13 VITALS
SYSTOLIC BLOOD PRESSURE: 128 MMHG | HEIGHT: 64.5 IN | TEMPERATURE: 96.9 F | WEIGHT: 240 LBS | BODY MASS INDEX: 40.47 KG/M2 | HEART RATE: 66 BPM | RESPIRATION RATE: 16 BRPM | OXYGEN SATURATION: 97 % | DIASTOLIC BLOOD PRESSURE: 69 MMHG

## 2021-10-13 DIAGNOSIS — K64.5 PERIANAL VENOUS THROMBOSIS: ICD-10-CM

## 2021-10-13 PROCEDURE — 99024 POSTOP FOLLOW-UP VISIT: CPT

## 2021-10-13 NOTE — PHYSICAL EXAM
[Tender, Swollen] : tender, swollen [Thrombosed] : that was not thrombosed [Normal] : was normal [No Rash or Lesion] : No rash or lesion [Alert] : alert [Oriented to Person] : oriented to person [Oriented to Place] : oriented to place [Oriented to Time] : oriented to time [Calm] : calm [de-identified] : right posterior edematous without associated clot [de-identified] : No apparent distress [de-identified] : Normocephalic atraumatic [de-identified] : Moving all extremities x4

## 2021-10-13 NOTE — HISTORY OF PRESENT ILLNESS
[FreeTextEntry1] : Ms. Melvin presents to the office for consultation regarding anorectal pain and a lump that has been present for the past week.  The lump appears to increase and decrease intermittently but the pain persists.  She admits to straining with stools.  Last colonoscopy was in 2018 and she is due for another one this year with Dr. Alcantara.\par \par 10/13/21 Ms. Melvin returns to the office for follow-up. Since her office appointment yesterday afternoon, she feels much improved but became concerned secondary to continued discomfort as well as palpable swelling at the site of prior TE H. She continues to have some difficulties with evacuating stools, though notes that she has not been eating very much lately secondary to the anorectal pain.

## 2021-10-20 ENCOUNTER — APPOINTMENT (OUTPATIENT)
Dept: COLORECTAL SURGERY | Facility: CLINIC | Age: 46
End: 2021-10-20
Payer: COMMERCIAL

## 2021-10-20 VITALS — HEIGHT: 64.5 IN | TEMPERATURE: 96.8 F | BODY MASS INDEX: 40.47 KG/M2 | RESPIRATION RATE: 14 BRPM | WEIGHT: 240 LBS

## 2021-10-20 DIAGNOSIS — M62.838 OTHER MUSCLE SPASM: ICD-10-CM

## 2021-10-20 DIAGNOSIS — R10.2 PELVIC AND PERINEAL PAIN: ICD-10-CM

## 2021-10-20 PROCEDURE — 99024 POSTOP FOLLOW-UP VISIT: CPT

## 2021-10-20 NOTE — PHYSICAL EXAM
[Tender, Swollen] : tender, swollen [Normal] : was normal [No Rash or Lesion] : No rash or lesion [Alert] : alert [Oriented to Person] : oriented to person [Oriented to Place] : oriented to place [Oriented to Time] : oriented to time [Calm] : calm [Thrombosed] : that was not thrombosed [de-identified] : right posterior edematous without associated clot [de-identified] : No apparent distress [de-identified] : Normocephalic atraumatic [de-identified] : Moving all extremities x4

## 2021-10-20 NOTE — HISTORY OF PRESENT ILLNESS
[FreeTextEntry1] : Ms. Melvin presents to the office for consultation regarding anorectal pain and a lump that has been present for the past week.  The lump appears to increase and decrease intermittently but the pain persists.  She admits to straining with stools.  Last colonoscopy was in 2018 and she is due for another one this year with Dr. Alcantara.\par \par 10/13/21 Ms. Melvin returns to the office for follow-up. Since her office appointment yesterday afternoon, she feels much improved but became concerned secondary to continued discomfort as well as palpable swelling at the site of prior TE H. She continues to have some difficulties with evacuating stools, though notes that she has not been eating very much lately secondary to the anorectal pain.\par \par 10/2021 Ms. Mlevin returns to the office for followup. Overall, much improved but reports continued pain that at times can be debilitating and come on suddenly. The pain is throbbing in nature, radiates and at times can be piercing. She has been compliant with usage of miralax nightly but feels as if it is still difficult for her to evacuate her stools. She has a visit with her GI doc in Dec to plan to schedule colonoscopy, last in 2018.

## 2021-10-20 NOTE — ASSESSMENT
[FreeTextEntry1] : Ms Melvin presents to the office with a right posterior thrombosed external hemorrhoid. I have discussed the etiology for this condition including excess straining with activity or straining to evacuate stools secondary to constipation or diarrhea. In office today, we proceeded to excise the ALMA to good effect. The patient was advised on what to expect post procedure. This includes some mild discomfort which should be readily alleviated by over-the-counter pain medications. Sitz baths will need to be performed to keep the wound site clean to facilitate healing. There will be some mild serosanguineous drainage to be anticipated and this should resolve as the wound heals. Cotton gauze should be placed in undergarments to prevent soilage from drainage. A bowel regimen consisting of a high fiber diet, ample daily water intake and miralax as needed is recommended to prevent further episodes of straining and further episodes of thrombosis. She was also advised to avoid heavy lifting and straining for the next two days to prevent reaccumulation of thrombus.  Worsening bleeding from the site can be addressed by laying in the left lateral decubitus position while holding pressure at the site.\par \par Followup with Dr. Alcantara for surveillance colonoscopy.\par \par 10/13/21 Ms. Melvin returns to the office with concerns for recurrent ALMA. At yesterday's site of excision, there is noted to be edematous tissue without e/o recurrent thrombus. Reassured of negative findings, and that no additional intervention is necessary. Avoid straining and heavy lifting, continue bowel regimen with miralax, sitz baths for comfort and ice packs as needed to reduce swelling.\par \par 10/20/21 Ms. Melvin returns to the office for followup. Overall, she is improved, but continues to have edematous non thrombosed external hemorrhoids. She appears to have developed pelvic floor spasms and an impaired ability to properly relax her pelvic floor to allow for ease of evacuation. Have recommended to continue with miralax and to will order valium suppositories for treatment of pelvic floor spasms. Reassured and return to office if no improvement.

## 2021-10-21 ENCOUNTER — NON-APPOINTMENT (OUTPATIENT)
Age: 46
End: 2021-10-21

## 2021-10-25 ENCOUNTER — NON-APPOINTMENT (OUTPATIENT)
Age: 46
End: 2021-10-25

## 2021-12-03 ENCOUNTER — APPOINTMENT (OUTPATIENT)
Dept: COLORECTAL SURGERY | Facility: CLINIC | Age: 46
End: 2021-12-03
Payer: COMMERCIAL

## 2021-12-03 VITALS
OXYGEN SATURATION: 98 % | HEART RATE: 108 BPM | HEIGHT: 64.5 IN | DIASTOLIC BLOOD PRESSURE: 80 MMHG | SYSTOLIC BLOOD PRESSURE: 133 MMHG | BODY MASS INDEX: 38.11 KG/M2 | WEIGHT: 226 LBS

## 2021-12-03 DIAGNOSIS — K62.89 OTHER SPECIFIED DISEASES OF ANUS AND RECTUM: ICD-10-CM

## 2021-12-03 PROCEDURE — 46600 DIAGNOSTIC ANOSCOPY SPX: CPT

## 2021-12-03 PROCEDURE — 99214 OFFICE O/P EST MOD 30 MIN: CPT | Mod: 25

## 2021-12-03 NOTE — PROCEDURE
[FreeTextEntry1] : Anoscopy with grade 2 internal hemorrhoids.  Small sinus in the posterior midline with patient without a clear anal fissure.

## 2021-12-03 NOTE — HISTORY OF PRESENT ILLNESS
[FreeTextEntry1] : 46-year-old female who presents for follow-up visit for anal pain.  She has been seen in the past for thrombosed external hemorrhoid which was initially treated with office-based removal of the clot.  She also developed severe pelvic pain and was treated with Valium suppositories which helped her symptoms significantly.  Her pain has improved overall and she is here to for any anorectal exam as this could not really be performed initially during her acute presentation given the severe pain in the area.  She feels the pain is still constant in nature but has improved significantly.  She reports radiation to the pelvic area and also feels it in the coccyx bone.  She has been using MiraLAX regularly to help with her constipation.  She denies any blood in her stools.  She is due for colonoscopy soon with Dr. Alcantara for family history of colon cancer.

## 2021-12-03 NOTE — PHYSICAL EXAM
[Normal] : was normal [Posterior] : posteriorly [None] : there was no rectal mass  [Respiratory Effort] : normal respiratory effort [Calm] : calm [Excoriation] : no perianal excoriation [Gross Blood] : no gross blood [de-identified] : Grade 2 internal hemorrhoids [de-identified] : Nonthrombosed external hemorrhoids with a large firm posterior midline skin tag/hypertrophic papillae [de-identified] : Well-appearing, in no distress [de-identified] : Normocephalic, atraumatic [de-identified] : Moves extremities without difficulty [de-identified] : Warm and dry [de-identified] : Alert and oriented x3

## 2021-12-14 ENCOUNTER — APPOINTMENT (OUTPATIENT)
Dept: CARDIOLOGY | Facility: CLINIC | Age: 46
End: 2021-12-14
Payer: COMMERCIAL

## 2021-12-14 ENCOUNTER — NON-APPOINTMENT (OUTPATIENT)
Age: 46
End: 2021-12-14

## 2021-12-14 VITALS
TEMPERATURE: 98.1 F | HEIGHT: 64.5 IN | DIASTOLIC BLOOD PRESSURE: 82 MMHG | WEIGHT: 219 LBS | HEART RATE: 95 BPM | OXYGEN SATURATION: 98 % | SYSTOLIC BLOOD PRESSURE: 135 MMHG | BODY MASS INDEX: 36.93 KG/M2

## 2021-12-14 DIAGNOSIS — F41.9 ANXIETY DISORDER, UNSPECIFIED: ICD-10-CM

## 2021-12-14 DIAGNOSIS — F32.A ANXIETY DISORDER, UNSPECIFIED: ICD-10-CM

## 2021-12-14 DIAGNOSIS — I16.0 HYPERTENSIVE URGENCY: ICD-10-CM

## 2021-12-14 DIAGNOSIS — R25.2 CRAMP AND SPASM: ICD-10-CM

## 2021-12-14 PROCEDURE — 93000 ELECTROCARDIOGRAM COMPLETE: CPT

## 2021-12-14 PROCEDURE — 99215 OFFICE O/P EST HI 40 MIN: CPT

## 2021-12-14 RX ORDER — DIAZEPAM 100 %
POWDER (GRAM) MISCELLANEOUS
Qty: 50 | Refills: 0 | Status: DISCONTINUED | COMMUNITY
Start: 2021-10-20 | End: 2021-12-14

## 2021-12-17 ENCOUNTER — APPOINTMENT (OUTPATIENT)
Dept: GASTROENTEROLOGY | Facility: CLINIC | Age: 46
End: 2021-12-17
Payer: COMMERCIAL

## 2021-12-17 ENCOUNTER — NON-APPOINTMENT (OUTPATIENT)
Age: 46
End: 2021-12-17

## 2021-12-17 VITALS
OXYGEN SATURATION: 95 % | BODY MASS INDEX: 37.1 KG/M2 | HEART RATE: 89 BPM | TEMPERATURE: 99.5 F | WEIGHT: 220 LBS | HEIGHT: 64.5 IN | SYSTOLIC BLOOD PRESSURE: 118 MMHG | DIASTOLIC BLOOD PRESSURE: 76 MMHG

## 2021-12-17 PROCEDURE — 99204 OFFICE O/P NEW MOD 45 MIN: CPT

## 2021-12-17 RX ORDER — TRAMADOL HYDROCHLORIDE 50 MG/1
50 TABLET, COATED ORAL
Qty: 10 | Refills: 0 | Status: DISCONTINUED | COMMUNITY
Start: 2021-10-12 | End: 2021-12-17

## 2021-12-17 NOTE — PHYSICAL EXAM
[General Appearance - Alert] : alert [General Appearance - In No Acute Distress] : in no acute distress [General Appearance - Well Nourished] : well nourished [General Appearance - Well Developed] : well developed [Sclera] : the sclera and conjunctiva were normal [] : no respiratory distress [Respiration, Rhythm And Depth] : normal respiratory rhythm and effort [Exaggerated Use Of Accessory Muscles For Inspiration] : no accessory muscle use [Auscultation Breath Sounds / Voice Sounds] : lungs were clear to auscultation bilaterally [Apical Impulse] : the apical impulse was normal [Heart Rate And Rhythm] : heart rate was normal and rhythm regular [Heart Sounds] : normal S1 and S2 [Bowel Sounds] : normal bowel sounds [Abdomen Soft] : soft [Abdomen Tenderness] : non-tender [Femoral Lymph Nodes Enlarged Bilaterally] : femoral [Oriented To Time, Place, And Person] : oriented to person, place, and time [Impaired Insight] : insight and judgment were intact [Affect] : the affect was normal

## 2021-12-17 NOTE — REASON FOR VISIT
[Initial Evaluation] : an initial evaluation [FreeTextEntry1] : constipation, family hx of colon cancer and polyps. personal hx of colon polyps

## 2021-12-17 NOTE — DISCUSSION/SUMMARY
[Patient] : the patient [Risks] : risks [Benefits] : benefits [Alternatives] : alternatives [With Me] : with me [___ Month(s)] : in [unfilled] month(s) [FreeTextEntry1] : This is a 43 year old woman withe headaches, left arm numbness with dyspnea on exertion\par \par 1) Anxiety:/depression: excitalopram. 10 mg daily.  psychiatry referral. \par 1) chest pain: recent stress test. no ischemia. Likely heart burn. protonix 40 mg daily before break fast. if continues to have symptoms, then repeat stress test. \par 1) hypertension controlled.   ct norvasc 5, valsartan 160 and HCTZ 25 mg daily.  \par 2) dyspnea on exertion:  resolved.  \par 3) Left arm numbness: Carotid US. mild atherosclerosis. Diet and exercise Bp control. \par 4)    off  choleterol meds. Exercise diet.  \par 5) venous insufficiency:  Reflux in legs \par 6) Cramping in the legs:   . coq10 and magnesium. . compression stockings not covered by insurance. \par 6) Intensive behavioral counselling performed. Calculated BMI =  41 kg/msq.   \par Will order and review ECG for the above mentioned diagnosis/condition/symptoms   x

## 2021-12-17 NOTE — ASSESSMENT
[FreeTextEntry1] : A/P\par The patient is here with family history of colon cancer family history of colon polyps personal history of colon polyps internal hemorrhoids and constipation.\par -Patient asked that I did not do a rectal exam today as she is just pain-free from her hemorrhoidectomy and anal fissure for the past 4 to 5 days.\par -Patient with constipation and will take MiraLAX daily I explained importance of high-fiber diet to keep stools soft as to not exacerbate the fissure or hemorrhoids\par -Follow-up with colorectal surgery as scheduled\par -Due for colonoscopy in 2023\par -We discussed genetic testing for Westfall syndrome however patient states it is difficult for her to follow-up with her current family stressors\par -I gave her the names of a few primary MDs as patient states she is still having anxiety and does not follow with a primary MD

## 2021-12-17 NOTE — HISTORY OF PRESENT ILLNESS
[FreeTextEntry1] : dyspnea on exertion, left leg cramp, and difficulty in vision\par \par HPI for today:   she has a lot of stress. she is depressed  .  she was very anxious. \par she needs meds for hel;p.  \par \par old note: feels good. had episode of cramps in the legs. pain while walking\par no dyspnea. no chest pain. \par \par old note: : gained weight.  no chest pain. no headaches. no dizziness. no chest pain and no dyspnea.  no palpitations. \par \par old note: state her varicose veins of right leg burst. she c/o pulsatile mass on the leg that is intermittent. \par she has chest pain intermittent. not on exertion.\par Also has some heart burn. \par \par old note:  patient dd not lose weight\par she feels better with BP meds. migraine headches.  headaches improved significantly. \par \par \par \par

## 2021-12-17 NOTE — CARDIOLOGY SUMMARY
[No Ischemia] : no Ischemia [No Exercise Ind Arr] : no exercise induced arrhythmias [No Symptoms] : no Symptoms [___] : [unfilled] [LVEF ___%] : LVEF [unfilled]% [Normal] : normal LA size [None] : no mitral regurgitation [de-identified] : 12 14 2021 Sinus  Rhythm \par WITHIN NORMAL LIMITS\par \par 8/4/2021  Sinus  Rhythm \par  non specific ST T changes. \par   [de-identified] : US carotid: midl atherosclerosis. no stenosis

## 2021-12-17 NOTE — HISTORY OF PRESENT ILLNESS
[de-identified] : The patient is here for family history of colon cancer in her brother who is 26, brother with colon polyps personal history of colon polyp and family history of pancreatic cancer in her father.  The patient's last colonoscopy in 2018 showed internal hemorrhoids no polyps.  Patient does have a remote history of chronic constipation and rectal bleeding.  She saw colorectal surgery for thrombosed hemorrhoids.  She had hemorrhoidectomy and then had anal fissure.  She states she has only been pain-free for the past 4 to 5 days.  I reviewed the colorectal surgery notes.  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  Patient has no recent blood work.\par    The patient states she has been taking MiraLAX and her constipation is now regular.  She admits to a lot of anxieties as she is having a lot of personal issues.  She was given Lexapro pro by cardiology and she has been taking it for about a week.  She does not feel that her anxiety is any better.  She does not have primary MD that she follows with

## 2022-12-13 ENCOUNTER — NON-APPOINTMENT (OUTPATIENT)
Age: 47
End: 2022-12-13

## 2022-12-13 ENCOUNTER — APPOINTMENT (OUTPATIENT)
Dept: CARDIOLOGY | Facility: CLINIC | Age: 47
End: 2022-12-13

## 2022-12-13 VITALS
WEIGHT: 242 LBS | DIASTOLIC BLOOD PRESSURE: 100 MMHG | HEIGHT: 65 IN | HEART RATE: 87 BPM | OXYGEN SATURATION: 97 % | BODY MASS INDEX: 40.32 KG/M2 | SYSTOLIC BLOOD PRESSURE: 160 MMHG

## 2022-12-13 VITALS — DIASTOLIC BLOOD PRESSURE: 85 MMHG | SYSTOLIC BLOOD PRESSURE: 131 MMHG

## 2022-12-13 DIAGNOSIS — I87.2 VENOUS INSUFFICIENCY (CHRONIC) (PERIPHERAL): ICD-10-CM

## 2022-12-13 PROCEDURE — 93000 ELECTROCARDIOGRAM COMPLETE: CPT

## 2022-12-13 PROCEDURE — 99215 OFFICE O/P EST HI 40 MIN: CPT | Mod: 25

## 2022-12-13 RX ORDER — ESCITALOPRAM OXALATE 10 MG/1
10 TABLET ORAL DAILY
Qty: 30 | Refills: 1 | Status: DISCONTINUED | COMMUNITY
Start: 2021-12-14 | End: 2022-12-13

## 2022-12-13 RX ORDER — MAGNESIUM OXIDE 241.3 MG/1000MG
400 TABLET ORAL DAILY
Qty: 30 | Refills: 1 | Status: DISCONTINUED | COMMUNITY
Start: 2021-08-04 | End: 2022-12-13

## 2023-03-16 ENCOUNTER — APPOINTMENT (OUTPATIENT)
Dept: PULMONOLOGY | Facility: CLINIC | Age: 48
End: 2023-03-16
Payer: COMMERCIAL

## 2023-03-16 VITALS
WEIGHT: 246 LBS | SYSTOLIC BLOOD PRESSURE: 148 MMHG | BODY MASS INDEX: 40.94 KG/M2 | DIASTOLIC BLOOD PRESSURE: 80 MMHG | HEART RATE: 74 BPM | OXYGEN SATURATION: 98 % | RESPIRATION RATE: 16 BRPM

## 2023-03-16 PROCEDURE — 99214 OFFICE O/P EST MOD 30 MIN: CPT

## 2023-03-16 NOTE — HISTORY OF PRESENT ILLNESS
[Home] : home [Obstructive Sleep Apnea] : obstructive sleep apnea [Awakes Unrefreshed] : awakes unrefreshed [Awakes with Dry Mouth] : awakes with dry mouth [Awakes with Headache] : awakes with headache [Daytime Somnolence] : daytime somnolence [Snoring] : snoring [Witnessed Apneas] : witnessed apneas [TextBox_77] : 7264 [TextBox_79] : 1000 [TextBox_81] : 5 [TextBox_89] : 2 [TextBox_93] : gasping for air on awakening, reads 8-10am [TextBox_100] : 9/1/2020 [TextBox_108] : 12.5 [ESS] : 5

## 2023-03-16 NOTE — DISCUSSION/SUMMARY
[FreeTextEntry1] : 48-year-old female seen today for reevaluation of untreated obstructive sleep apnea.  Because of frequent awakenings patient will undergo an attended sleep study with hypnotic sleep inducer.

## 2023-04-18 ENCOUNTER — OUTPATIENT (OUTPATIENT)
Dept: OUTPATIENT SERVICES | Facility: HOSPITAL | Age: 48
LOS: 1 days | End: 2023-04-18
Payer: COMMERCIAL

## 2023-04-18 DIAGNOSIS — G47.33 OBSTRUCTIVE SLEEP APNEA (ADULT) (PEDIATRIC): ICD-10-CM

## 2023-04-18 PROCEDURE — 95810 POLYSOM 6/> YRS 4/> PARAM: CPT | Mod: 26

## 2023-04-18 PROCEDURE — 95810 POLYSOM 6/> YRS 4/> PARAM: CPT

## 2023-05-17 ENCOUNTER — APPOINTMENT (OUTPATIENT)
Dept: PULMONOLOGY | Facility: CLINIC | Age: 48
End: 2023-05-17
Payer: COMMERCIAL

## 2023-05-17 VITALS
BODY MASS INDEX: 40.98 KG/M2 | SYSTOLIC BLOOD PRESSURE: 132 MMHG | WEIGHT: 246 LBS | HEART RATE: 85 BPM | OXYGEN SATURATION: 5 % | HEIGHT: 65 IN | RESPIRATION RATE: 16 BRPM | DIASTOLIC BLOOD PRESSURE: 78 MMHG

## 2023-05-17 PROCEDURE — 99214 OFFICE O/P EST MOD 30 MIN: CPT

## 2023-05-17 RX ORDER — POLYETHYLENE GLYCOL 3350 17 G/17G
17 POWDER, FOR SOLUTION ORAL DAILY
Qty: 30 | Refills: 5 | Status: DISCONTINUED | COMMUNITY
Start: 2021-10-12 | End: 2023-05-17

## 2023-05-17 RX ORDER — ZOLPIDEM TARTRATE 10 MG/1
10 TABLET ORAL
Qty: 1 | Refills: 0 | Status: DISCONTINUED | COMMUNITY
Start: 2023-03-16 | End: 2023-05-17

## 2023-05-17 RX ORDER — HYDROCORTISONE 25 MG/G
2.5 CREAM TOPICAL TWICE DAILY
Qty: 1 | Refills: 2 | Status: DISCONTINUED | COMMUNITY
Start: 2021-10-07 | End: 2023-05-17

## 2023-05-17 NOTE — HISTORY OF PRESENT ILLNESS
[Obstructive Sleep Apnea] : obstructive sleep apnea [Awakes Unrefreshed] : awakes unrefreshed [Awakes with Dry Mouth] : awakes with dry mouth [Awakes with Headache] : awakes with headache [Daytime Somnolence] : daytime somnolence [Snoring] : snoring [Witnessed Apneas] : witnessed apneas [Lab] : lab [APAP:] : APAP [TextBox_77] : 5341 [TextBox_79] : 1000 [TextBox_81] : 5 [TextBox_89] : 2 [TextBox_93] : No further episodes of gasping [TextBox_100] : 4/18/2023 [TextBox_108] : 11.0 [TextBox_112] : 27.3 [TextBox_116] : 84 [TextBox_120] : Rem index 30.6. Sleep time 472.9 min [ESS] : 5

## 2023-05-17 NOTE — DISCUSSION/SUMMARY
[Obstructive Sleep Apnea] : obstructive sleep apnea [Mild] : mild [None] : There are no changes in medication management [Alcohol Avoidance] : alcohol avoidance [Sedative Avoidance] : sedative avoidance [Weight Loss Program] : weight loss program [Intra-Oral Device] : intra-oral device [Sleep Study] : sleep study [Patient] : discussed with the patient [de-identified] : sever in REM [de-identified] : Repeat home study after oral appliance [FreeTextEntry1] : Patient also advised that her episodes of gasping may have been due to reflux.  She is to avoid eating for about 4 hours before retiring.  Minimize alcohol as well as caffeine.  Also maintain some rise to the head of her bed by at least 10 degrees.

## 2023-12-29 ENCOUNTER — APPOINTMENT (OUTPATIENT)
Dept: CARDIOLOGY | Facility: CLINIC | Age: 48
End: 2023-12-29
Payer: COMMERCIAL

## 2023-12-29 VITALS
OXYGEN SATURATION: 96 % | DIASTOLIC BLOOD PRESSURE: 81 MMHG | BODY MASS INDEX: 41.32 KG/M2 | WEIGHT: 248 LBS | HEIGHT: 65 IN | SYSTOLIC BLOOD PRESSURE: 129 MMHG | TEMPERATURE: 98.1 F | HEART RATE: 83 BPM

## 2023-12-29 PROCEDURE — 99215 OFFICE O/P EST HI 40 MIN: CPT | Mod: 25

## 2023-12-29 PROCEDURE — 93000 ELECTROCARDIOGRAM COMPLETE: CPT

## 2023-12-29 NOTE — DISCUSSION/SUMMARY
[Patient] : the patient [Risks] : risks [Benefits] : benefits [Alternatives] : alternatives [With Me] : with me [___ Month(s)] : in [unfilled] month(s) [FreeTextEntry1] : This is a 48 year old woman withe headaches, left arm numbness with dyspnea on exertion  1) Anxiety:/depression: excitalopram. 10 mg daily.  psychiatry referral.   2 hypertension controlled.   ct norvasc 5, valsartan 160 and HCTZ 25 mg daily.   3) dyspnea on exertion:  resolved.    4) venous insufficiency:  Reflux in legs  6) Cramping in the legs:   . coq10 and magnesium. . compression stockings not covered by insurance.  6) Intensive behavioral counselling performed. Calculated BMI =  41 kg/msq.    mounjaro Will order and review ECG for the above mentioned diagnosis/condition/symptoms   x  patient will bring results of blood work from her PCP  [EKG obtained to assist in diagnosis and management of assessed problem(s)] : EKG obtained to assist in diagnosis and management of assessed problem(s)

## 2023-12-29 NOTE — HISTORY OF PRESENT ILLNESS
[FreeTextEntry1] : dyspnea on exertion, left leg cramp, and difficulty in vision  HPI for today:   she has problems with iron.  low iron.  she has gained a lot of weight.     has back pain. she had heavy menstrual bleeding. and hormonal treatment.  adn now no more significan tmenstrual bleeding.   and now iron better no chest pain . no dyspnea on exertion    ol dnote:  she has a lot of stress. she is depressed  .  she was very anxious.  she needs meds for hel;p.    old note: feels good. had episode of cramps in the legs. pain while walking no dyspnea. no chest pain.   old note: : gained weight.  no chest pain. no headaches. no dizziness. no chest pain and no dyspnea.  no palpitations.   old note: state her varicose veins of right leg burst. she c/o pulsatile mass on the leg that is intermittent.  she has chest pain intermittent. not on exertion. Also has some heart burn.   old note:  patient dd not lose weight she feels better with BP meds. migraine headches.  headaches improved significantly.

## 2023-12-29 NOTE — CARDIOLOGY SUMMARY
[de-identified] : 12 29 2023:   12 14 2021 Sinus  Rhythm  WITHIN NORMAL LIMITS  8/4/2021  Sinus  Rhythm   non specific ST T changes.    [No Ischemia] : no Ischemia [No Exercise Ind Arr] : no exercise induced arrhythmias [No Symptoms] : no Symptoms [___] : [unfilled] [LVEF ___%] : LVEF [unfilled]% [Normal] : normal LA size [None] : no mitral regurgitation [de-identified] : US carotid: midl atherosclerosis. no stenosis

## 2023-12-29 NOTE — REASON FOR VISIT
[FreeTextEntry1] : dyspnea on exertion, left leg cramp, and difficulty in vision [Initial Evaluation] : an initial evaluation of [FreeTextEntry2] : dyspnea on exertion, left leg cramp, and difficulty in vision

## 2024-03-22 ENCOUNTER — NON-APPOINTMENT (OUTPATIENT)
Age: 49
End: 2024-03-22

## 2024-04-01 ENCOUNTER — APPOINTMENT (OUTPATIENT)
Dept: CARDIOLOGY | Facility: CLINIC | Age: 49
End: 2024-04-01
Payer: COMMERCIAL

## 2024-04-01 ENCOUNTER — NON-APPOINTMENT (OUTPATIENT)
Age: 49
End: 2024-04-01

## 2024-04-01 VITALS
WEIGHT: 214 LBS | OXYGEN SATURATION: 97 % | HEIGHT: 63 IN | BODY MASS INDEX: 37.92 KG/M2 | DIASTOLIC BLOOD PRESSURE: 74 MMHG | HEART RATE: 73 BPM | SYSTOLIC BLOOD PRESSURE: 133 MMHG

## 2024-04-01 PROCEDURE — 93000 ELECTROCARDIOGRAM COMPLETE: CPT

## 2024-04-01 PROCEDURE — 99214 OFFICE O/P EST MOD 30 MIN: CPT | Mod: 25

## 2024-04-01 RX ORDER — VALSARTAN AND HYDROCHLOROTHIAZIDE 160; 25 MG/1; MG/1
160-25 TABLET, FILM COATED ORAL
Qty: 90 | Refills: 3 | Status: ACTIVE | COMMUNITY
Start: 2021-06-03 | End: 1900-01-01

## 2024-04-01 RX ORDER — AMLODIPINE BESYLATE 5 MG/1
5 TABLET ORAL DAILY
Qty: 90 | Refills: 3 | Status: ACTIVE | COMMUNITY
Start: 2021-06-03 | End: 1900-01-01

## 2024-04-23 NOTE — ED STATDOCS - SKIN, MLM
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  Patient has had fever for past 4-5d.  Mom and aunt at bedside note she has had poor po intake but no vomiting.  Has been drinking water or milk but not eating food.  Patient nonverbal so unable to verbalize symptoms.  No cough, ear tugging, sob, abd pain, diarrhea.  Had negative flu/ covid at [facility] and negative strep at pediatrician over past few days.  Was sent in for concern for possible uti given previously negative testing.  Patient here appears well and well hydrated.  Drinking water during interview.  Patient becomes agitated near any staff but calm with family.  Exam reassuring but with fever here, will get urine eval uti. skin normal color for race, warm, dry and intact.

## 2024-06-19 RX ORDER — TIRZEPATIDE 10 MG/.5ML
10 INJECTION, SOLUTION SUBCUTANEOUS
Qty: 1 | Refills: 0 | Status: ACTIVE | COMMUNITY
Start: 2023-12-29 | End: 1900-01-01

## 2024-06-19 RX ORDER — POTASSIUM CHLORIDE 1500 MG/1
20 TABLET, FILM COATED, EXTENDED RELEASE ORAL DAILY
Qty: 60 | Refills: 0 | Status: ACTIVE | COMMUNITY
Start: 2024-04-11 | End: 1900-01-01

## 2024-07-02 ENCOUNTER — APPOINTMENT (OUTPATIENT)
Dept: CARDIOLOGY | Facility: CLINIC | Age: 49
End: 2024-07-02
Payer: COMMERCIAL

## 2024-07-02 ENCOUNTER — NON-APPOINTMENT (OUTPATIENT)
Age: 49
End: 2024-07-02

## 2024-07-02 VITALS
DIASTOLIC BLOOD PRESSURE: 87 MMHG | BODY MASS INDEX: 34.55 KG/M2 | OXYGEN SATURATION: 98 % | HEIGHT: 63 IN | SYSTOLIC BLOOD PRESSURE: 122 MMHG | TEMPERATURE: 98 F | WEIGHT: 195 LBS | HEART RATE: 74 BPM

## 2024-07-02 DIAGNOSIS — R39.11 HESITANCY OF MICTURITION: ICD-10-CM

## 2024-07-02 DIAGNOSIS — E66.01 MORBID (SEVERE) OBESITY DUE TO EXCESS CALORIES: ICD-10-CM

## 2024-07-02 DIAGNOSIS — I83.90 ASYMPTOMATIC VARICOSE VEINS OF UNSPECIFIED LOWER EXTREMITY: ICD-10-CM

## 2024-07-02 DIAGNOSIS — Z00.00 ENCOUNTER FOR GENERAL ADULT MEDICAL EXAMINATION W/OUT ABNORMAL FINDINGS: ICD-10-CM

## 2024-07-02 DIAGNOSIS — R03.0 ELEVATED BLOOD-PRESSURE READING, W/OUT DIAGNOSIS OF HYPERTENSION: ICD-10-CM

## 2024-07-02 DIAGNOSIS — R06.09 OTHER FORMS OF DYSPNEA: ICD-10-CM

## 2024-07-02 DIAGNOSIS — R20.0 ANESTHESIA OF SKIN: ICD-10-CM

## 2024-07-02 DIAGNOSIS — I10 ESSENTIAL (PRIMARY) HYPERTENSION: ICD-10-CM

## 2024-07-02 DIAGNOSIS — G47.33 OBSTRUCTIVE SLEEP APNEA (ADULT) (PEDIATRIC): ICD-10-CM

## 2024-07-02 DIAGNOSIS — R10.9 UNSPECIFIED ABDOMINAL PAIN: ICD-10-CM

## 2024-07-02 DIAGNOSIS — R07.89 OTHER CHEST PAIN: ICD-10-CM

## 2024-07-02 PROCEDURE — 99215 OFFICE O/P EST HI 40 MIN: CPT | Mod: 25

## 2024-07-02 PROCEDURE — G2211 COMPLEX E/M VISIT ADD ON: CPT | Mod: NC

## 2024-07-02 PROCEDURE — 93000 ELECTROCARDIOGRAM COMPLETE: CPT

## 2024-07-12 ENCOUNTER — RX CHANGE (OUTPATIENT)
Age: 49
End: 2024-07-12

## 2024-07-15 ENCOUNTER — RX RENEWAL (OUTPATIENT)
Age: 49
End: 2024-07-15

## 2024-07-17 ENCOUNTER — NON-APPOINTMENT (OUTPATIENT)
Age: 49
End: 2024-07-17

## 2024-07-17 DIAGNOSIS — E87.6 HYPOKALEMIA: ICD-10-CM

## 2024-07-23 ENCOUNTER — APPOINTMENT (OUTPATIENT)
Dept: VASCULAR SURGERY | Facility: CLINIC | Age: 49
End: 2024-07-23
Payer: COMMERCIAL

## 2024-07-23 VITALS
TEMPERATURE: 98.6 F | SYSTOLIC BLOOD PRESSURE: 105 MMHG | OXYGEN SATURATION: 96 % | DIASTOLIC BLOOD PRESSURE: 73 MMHG | HEART RATE: 85 BPM | RESPIRATION RATE: 16 BRPM | HEIGHT: 64 IN | BODY MASS INDEX: 32.44 KG/M2 | WEIGHT: 190 LBS

## 2024-07-23 DIAGNOSIS — I83.10 VARICOSE VEINS OF UNSPECIFIED LOWER EXTREMITY WITH INFLAMMATION: ICD-10-CM

## 2024-07-23 PROCEDURE — 99203 OFFICE O/P NEW LOW 30 MIN: CPT

## 2024-07-23 PROCEDURE — 93971 EXTREMITY STUDY: CPT | Mod: RT

## 2024-07-23 NOTE — PHYSICAL EXAM
[2+] : left 2+ [de-identified] :  v appears well [de-identified] : Right medial thigh large varicosity extending down to the right medial calf.  Positive edema of the right lower extremity.  Left lower extremity without edema and/or varicosities.

## 2024-07-23 NOTE — ASSESSMENT
[FreeTextEntry1] : 49-year-old female with C3 symptomatic varicose veins despite conservative management.  She has greater saphenous vein reflux with associated refluxing varicosities.  Believe she would benefit from ablation of right greater saphenous vein.  Explained to her that subsequently her varicosities may shrink in her symptoms and may resolve if not we can consider ultrasound-guided sclerotherapy and/or stab phlebectomies as a adjunct procedures.   Total encounter total time 30 mins >50% of time spent counseling/coordinating care

## 2024-07-23 NOTE — HISTORY OF PRESENT ILLNESS
[FreeTextEntry1] : Pleasant 49-year-old female.  Longstanding history of pain and pressure./Compression pantyhose's.  It provides some relief but still her legs feel painful at the end of the day.  She has lost 60 pounds and has noticed a vein on her right medial thigh is more prominent.  There is a family history of varicose veins with her mother having significant veins.  She has had 2 childbirths

## 2024-08-22 ENCOUNTER — APPOINTMENT (OUTPATIENT)
Dept: VASCULAR SURGERY | Facility: CLINIC | Age: 49
End: 2024-08-22

## 2024-08-22 VITALS
RESPIRATION RATE: 16 BRPM | SYSTOLIC BLOOD PRESSURE: 94 MMHG | TEMPERATURE: 98.2 F | HEIGHT: 64 IN | OXYGEN SATURATION: 97 % | DIASTOLIC BLOOD PRESSURE: 60 MMHG | HEART RATE: 79 BPM | WEIGHT: 187.5 LBS | BODY MASS INDEX: 32.01 KG/M2

## 2024-08-22 VITALS — DIASTOLIC BLOOD PRESSURE: 73 MMHG | SYSTOLIC BLOOD PRESSURE: 104 MMHG

## 2024-08-22 PROCEDURE — 36465Z: CUSTOM | Mod: RT

## 2024-08-22 NOTE — PROCEDURE
[FreeTextEntry1] : Varithena of Right Great Saphenous Vein  [FreeTextEntry2] : Chronic Venous Insufficiency [FreeTextEntry3] :      OPERATIVE NOTE      Preoperative diagnosis: Chronic venous Insufficiency (I87.2); varicose veins with pain (I83.819)     Postoperative diagnosis: Chronic venous Insufficiency (I87.2); varicose veins with pain (I83.819)    Anesthesia:  Local 1% lidocaine anesthetic     Procedure:  Endovenous ablation by injection of non-compounded foam sclerosant of the right great saphenous vein with Ultrasound guidance (18074).     Active agent: Varithena, 1%, 5 total mL utilized     Findings:    The right great saphenous vein is visualized from the distal thigh to the saphenofemoral junction and is compressible. The common femoral vein is compressible with no filling defect.     Risks which include but are not limited to infection, bleeding, hematoma, nerve injury/lymphatic injury, superficial venous thrombosis, deep venous thrombosis, pulmonary embolism, heart attack, stroke, neurological symptoms, allergic reactions, skin ulceration / skin pigmentation, unfavorable cosmetic result, failure of procedure and need for additional intervention.     Procedure:    The patient was evaluated in reverse Trendelenburg position with the leg externally-rotated. The saphenous vein, tributaries, varicosities associated with the saphenous vein, and perforating veins associated with the saphenous vein were evaluated with ultrasound. Mapping was performed including marking of major tributaries into the saphenous vein as well as location of perforating veins, both competent and incompetent. The extent of treatment of the saphenous vein and of the associated varicosities was determined through this personal mapping by the treating physician.     The extremity was prepped with betadine skin prep and sterile drapes were applied. The skin was anesthetized with a small intradermal injection of 1% lidocaine at the chosen puncture site overlying the saphenous vein. A micropuncture kit was used to access the saphenous vein with ultrasound guidance the vein was punctured with a micro-puncture needle followed by passage of a 0.018 guidewire through the puncture needle and a 4 Korean dilator sheath assembly was inserted over the guidewire. The wire and dilator were removed and the sheath was flushed with saline solution. The patient was then placed in Trendelenburg position 45 degrees while maintaining sterility of the drapes overlying the elevated leg. The Varithena canister was activated and the initial foam generated was discarded. Varithena foam was aspirated in a sterile syringe and attached to 4 F sheath for injection of Varithena.  Injection of the Varithena was done at 1/2 to 1 cc per second with observation by ultrasound. When the Varithena foam arrived to approximately 3-5 cm from the junction, the saphenous vein was then compressed to limit flow of foam into the deep system. Injection of Varithena was stopped at this point and the treated saphenous vein was observed with ultrasound over a period of 3-5 minutes watching for spasm to develop within the vein. Once spasm was confirmed throughout the treated segment of vein, additional injection of Varithena was performed through the vascular catheter with compression of the saphenous vein central to this to direct the flow of Varithena in a retrograde direction into the calf. Total infusion of Varithena in the saphenous vein was 5 cc.      The deep veins were then evaluated for flow and compressibility prior to dressing placement. The lower extremity was kept elevated at 45 to 60 degrees above the horizontal and a short stretch wrap was placed on the leg from the distal foot up to the groin. At this point compression pads were placed overlying the course of the treated veins and the short stretch wrap was then utilized to wrap back down from the groin to the ankle. This wrap was secured in place. A light weight overstocking was then placed over the lower extremity and then the patients 20-30 mmHg compression thigh length elastic support hose were placed on the patient. At this point the leg was placed back into the horizontal position.    The patient tolerated the procedure well without any adverse events. The patient was given instructions and was ambulated for 10 minutes under supervision.     Final questions were answered and the patient was then discharged home with instructions regarding post-procedure care, expectations, and return office visit appointment. Follow up visit will include color duplex ultrasound imaging of the great saphenous vein, the treated superficial varices, and the adjacent deep veins.

## 2024-08-26 ENCOUNTER — APPOINTMENT (OUTPATIENT)
Dept: VASCULAR SURGERY | Facility: CLINIC | Age: 49
End: 2024-08-26
Payer: COMMERCIAL

## 2024-08-26 ENCOUNTER — APPOINTMENT (OUTPATIENT)
Dept: VASCULAR SURGERY | Facility: CLINIC | Age: 49
End: 2024-08-26

## 2024-08-26 VITALS
TEMPERATURE: 98.4 F | RESPIRATION RATE: 16 BRPM | HEIGHT: 64 IN | OXYGEN SATURATION: 98 % | SYSTOLIC BLOOD PRESSURE: 94 MMHG | BODY MASS INDEX: 31.41 KG/M2 | DIASTOLIC BLOOD PRESSURE: 68 MMHG | WEIGHT: 184 LBS | HEART RATE: 78 BPM

## 2024-08-26 DIAGNOSIS — I83.10 VARICOSE VEINS OF UNSPECIFIED LOWER EXTREMITY WITH INFLAMMATION: ICD-10-CM

## 2024-08-26 PROCEDURE — 93971 EXTREMITY STUDY: CPT | Mod: RT

## 2024-08-26 PROCEDURE — 99212 OFFICE O/P EST SF 10 MIN: CPT

## 2024-08-26 NOTE — DISCUSSION/SUMMARY
[FreeTextEntry1] : 49-year-old female with C3 symptomatic varicose veins s/p RLE GSV Varithena. Venous duplex in office today demonstrates closed R GSV with non-occlusive thrombus at the SFJ   Plan Recommend ASA 81mg daily Continue daily compression with ace bandage.  Follow up in 2 weeks with repeat venous duplex

## 2024-08-26 NOTE — REASON FOR VISIT
[de-identified] : S/P RLE GSV Varithena [de-identified] : 8/22/24 [de-identified] : Patient s/p RLE GSV Varithena. Patient reports some relief of pain and pressure. Patient reorts decrease in size but continued bulging of varicosity. Patient continues to wear ace wraps as she reports they are more comfortable than compression stockings. Denies fevers, chills, LE swelling and wounds.

## 2024-08-26 NOTE — PHYSICAL EXAM
[Normal Rate and Rhythm] : normal rate and rhythm [2+] : left 2+ [Varicose Veins Of Lower Extremities] : present [Varicose Veins Of The Right Leg] : of the right leg [No Rash or Lesion] : No rash or lesion [Alert] : alert [Oriented to Person] : oriented to person [Oriented to Place] : oriented to place [Oriented to Time] : oriented to time [Calm] : calm [Ankle Swelling (On Exam)] : not present [Skin Ulcer] : no ulcer [de-identified] : Appears well, in no acute distress. [de-identified] : normocephalic, atraumatic [de-identified] :   normal respiratory effort [FreeTextEntry1] : RLE medial thigh/ calf varicosity, no wounds or signs of of infection, no edema [de-identified] : Moves all extremities.

## 2024-08-26 NOTE — REASON FOR VISIT
[de-identified] : S/P RLE GSV Varithena [de-identified] : 8/22/24 [de-identified] : Patient s/p RLE GSV Varithena. Patient reports some relief of pain and pressure. Patient reorts decrease in size but continued bulging of varicosity. Patient continues to wear ace wraps as she reports they are more comfortable than compression stockings. Denies fevers, chills, LE swelling and wounds.

## 2024-08-26 NOTE — PHYSICAL EXAM
[Normal Rate and Rhythm] : normal rate and rhythm [2+] : left 2+ [Varicose Veins Of Lower Extremities] : present [Varicose Veins Of The Right Leg] : of the right leg [No Rash or Lesion] : No rash or lesion [Alert] : alert [Oriented to Person] : oriented to person [Oriented to Place] : oriented to place [Oriented to Time] : oriented to time [Calm] : calm [Ankle Swelling (On Exam)] : not present [Skin Ulcer] : no ulcer [de-identified] : Appears well, in no acute distress. [de-identified] : normocephalic, atraumatic [de-identified] :   normal respiratory effort [FreeTextEntry1] : RLE medial thigh/ calf varicosity, no wounds or signs of of infection, no edema [de-identified] : Moves all extremities.

## 2024-09-09 ENCOUNTER — APPOINTMENT (OUTPATIENT)
Dept: VASCULAR SURGERY | Facility: CLINIC | Age: 49
End: 2024-09-09
Payer: COMMERCIAL

## 2024-09-09 VITALS
SYSTOLIC BLOOD PRESSURE: 113 MMHG | HEART RATE: 78 BPM | WEIGHT: 183 LBS | OXYGEN SATURATION: 95 % | HEIGHT: 64 IN | BODY MASS INDEX: 31.24 KG/M2 | RESPIRATION RATE: 16 BRPM | TEMPERATURE: 97.9 F | DIASTOLIC BLOOD PRESSURE: 80 MMHG

## 2024-09-09 DIAGNOSIS — I87.2 VENOUS INSUFFICIENCY (CHRONIC) (PERIPHERAL): ICD-10-CM

## 2024-09-09 DIAGNOSIS — I83.90 ASYMPTOMATIC VARICOSE VEINS OF UNSPECIFIED LOWER EXTREMITY: ICD-10-CM

## 2024-09-09 PROCEDURE — 99213 OFFICE O/P EST LOW 20 MIN: CPT

## 2024-09-09 PROCEDURE — 93971 EXTREMITY STUDY: CPT | Mod: RT

## 2024-09-11 NOTE — ASSESSMENT
[FreeTextEntry1] : 49-year-old female with symptomatic C2 varicose veins of the right lower extremity.  Now status post right GSV Varithena procedure. She still reports pain over the varicose vein in the right medial aspect of her leg.  Will schedule patient for ultrasound-guided sclerotherapy and stab phlebectomy in the OR.  10/8/2024

## 2024-09-11 NOTE — HISTORY OF PRESENT ILLNESS
[FreeTextEntry1] : VERONICA SOUZA is status post S/P RLE GSV Varithena and she is here for a post-op visit.   Surgery Date: 8/22/24 Patient s/p RLE GSV Varithena. Patient reports some relief of pain and pressure. Patient reorts decrease in size but continued bulging of varicosity. Patient continues to wear ace wraps as she reports they are more comfortable than compression stockings. Denies fevers, chills, LE swelling and wounds. [de-identified] : Patient presents for postop visit.  She recently underwent right GSV Varithena procedure with postop venous duplex demonstrating thrombus abutting the saphenofemoral junction.  Repeat venous duplex demonstrates that thrombus is now greater than 3 cm away from the saphenofemoral junction.  She reports improvement in her leg pain and heaviness however still reports pain over the varicose vein on the medial aspect of her right leg that is persistent despite compression stocking use

## 2024-09-11 NOTE — PHYSICAL EXAM
[Normal Rate and Rhythm] : normal rate and rhythm [2+] : left 2+ [Ankle Swelling (On Exam)] : not present [Varicose Veins Of Lower Extremities] : present [Varicose Veins Of The Right Leg] : of the right leg [No Rash or Lesion] : No rash or lesion [Skin Ulcer] : no ulcer [Alert] : alert [Oriented to Person] : oriented to person [Oriented to Place] : oriented to place [Oriented to Time] : oriented to time [Calm] : calm [de-identified] : Appears well, in no acute distress. [de-identified] : normocephalic, atraumatic [de-identified] :   normal respiratory effort [FreeTextEntry1] : RLE medial thigh/ calf varicosity, no wounds or signs of of infection, no edema [de-identified] : Moves all extremities.

## 2024-09-20 ENCOUNTER — OUTPATIENT (OUTPATIENT)
Dept: OUTPATIENT SERVICES | Facility: HOSPITAL | Age: 49
LOS: 1 days | End: 2024-09-20
Payer: COMMERCIAL

## 2024-09-20 VITALS
OXYGEN SATURATION: 98 % | TEMPERATURE: 97 F | HEIGHT: 64 IN | DIASTOLIC BLOOD PRESSURE: 72 MMHG | HEART RATE: 64 BPM | RESPIRATION RATE: 16 BRPM | SYSTOLIC BLOOD PRESSURE: 120 MMHG | WEIGHT: 182.98 LBS

## 2024-09-20 DIAGNOSIS — Z29.9 ENCOUNTER FOR PROPHYLACTIC MEASURES, UNSPECIFIED: ICD-10-CM

## 2024-09-20 DIAGNOSIS — D50.9 IRON DEFICIENCY ANEMIA, UNSPECIFIED: ICD-10-CM

## 2024-09-20 DIAGNOSIS — Z01.818 ENCOUNTER FOR OTHER PREPROCEDURAL EXAMINATION: ICD-10-CM

## 2024-09-20 DIAGNOSIS — E66.9 OBESITY, UNSPECIFIED: ICD-10-CM

## 2024-09-20 DIAGNOSIS — Z98.890 OTHER SPECIFIED POSTPROCEDURAL STATES: Chronic | ICD-10-CM

## 2024-09-20 DIAGNOSIS — I10 ESSENTIAL (PRIMARY) HYPERTENSION: ICD-10-CM

## 2024-09-20 DIAGNOSIS — I87.2 VENOUS INSUFFICIENCY (CHRONIC) (PERIPHERAL): ICD-10-CM

## 2024-09-20 LAB
A1C WITH ESTIMATED AVERAGE GLUCOSE RESULT: 5.1 % — SIGNIFICANT CHANGE UP (ref 4–5.6)
ALBUMIN SERPL ELPH-MCNC: 4.2 G/DL — SIGNIFICANT CHANGE UP (ref 3.3–5.2)
ALP SERPL-CCNC: 55 U/L — SIGNIFICANT CHANGE UP (ref 40–120)
ALT FLD-CCNC: 11 U/L — SIGNIFICANT CHANGE UP
ANION GAP SERPL CALC-SCNC: 12 MMOL/L — SIGNIFICANT CHANGE UP (ref 5–17)
APTT BLD: 32.2 SEC — SIGNIFICANT CHANGE UP (ref 24.5–35.6)
AST SERPL-CCNC: 15 U/L — SIGNIFICANT CHANGE UP
BASOPHILS # BLD AUTO: 0.06 K/UL — SIGNIFICANT CHANGE UP (ref 0–0.2)
BASOPHILS NFR BLD AUTO: 0.7 % — SIGNIFICANT CHANGE UP (ref 0–2)
BILIRUB SERPL-MCNC: 0.3 MG/DL — LOW (ref 0.4–2)
BLD GP AB SCN SERPL QL: SIGNIFICANT CHANGE UP
BUN SERPL-MCNC: 11.6 MG/DL — SIGNIFICANT CHANGE UP (ref 8–20)
CALCIUM SERPL-MCNC: 9.5 MG/DL — SIGNIFICANT CHANGE UP (ref 8.4–10.5)
CHLORIDE SERPL-SCNC: 103 MMOL/L — SIGNIFICANT CHANGE UP (ref 96–108)
CO2 SERPL-SCNC: 25 MMOL/L — SIGNIFICANT CHANGE UP (ref 22–29)
COMMENT - BLOOD BANK: SIGNIFICANT CHANGE UP
CREAT SERPL-MCNC: 0.63 MG/DL — SIGNIFICANT CHANGE UP (ref 0.5–1.3)
EGFR: 109 ML/MIN/1.73M2 — SIGNIFICANT CHANGE UP
EOSINOPHIL # BLD AUTO: 0.09 K/UL — SIGNIFICANT CHANGE UP (ref 0–0.5)
EOSINOPHIL NFR BLD AUTO: 1 % — SIGNIFICANT CHANGE UP (ref 0–6)
ESTIMATED AVERAGE GLUCOSE: 100 MG/DL — SIGNIFICANT CHANGE UP (ref 68–114)
GLUCOSE SERPL-MCNC: 95 MG/DL — SIGNIFICANT CHANGE UP (ref 70–99)
HCT VFR BLD CALC: 43.3 % — SIGNIFICANT CHANGE UP (ref 34.5–45)
HGB BLD-MCNC: 14.8 G/DL — SIGNIFICANT CHANGE UP (ref 11.5–15.5)
IMM GRANULOCYTES NFR BLD AUTO: 0.3 % — SIGNIFICANT CHANGE UP (ref 0–0.9)
INR BLD: 1.07 RATIO — SIGNIFICANT CHANGE UP (ref 0.85–1.18)
IRON SATN MFR SERPL: 79 UG/DL — SIGNIFICANT CHANGE UP (ref 37–145)
LYMPHOCYTES # BLD AUTO: 2.94 K/UL — SIGNIFICANT CHANGE UP (ref 1–3.3)
LYMPHOCYTES # BLD AUTO: 33.1 % — SIGNIFICANT CHANGE UP (ref 13–44)
MCHC RBC-ENTMCNC: 32 PG — SIGNIFICANT CHANGE UP (ref 27–34)
MCHC RBC-ENTMCNC: 34.2 GM/DL — SIGNIFICANT CHANGE UP (ref 32–36)
MCV RBC AUTO: 93.7 FL — SIGNIFICANT CHANGE UP (ref 80–100)
MONOCYTES # BLD AUTO: 0.42 K/UL — SIGNIFICANT CHANGE UP (ref 0–0.9)
MONOCYTES NFR BLD AUTO: 4.7 % — SIGNIFICANT CHANGE UP (ref 2–14)
NEUTROPHILS # BLD AUTO: 5.34 K/UL — SIGNIFICANT CHANGE UP (ref 1.8–7.4)
NEUTROPHILS NFR BLD AUTO: 60.2 % — SIGNIFICANT CHANGE UP (ref 43–77)
PLATELET # BLD AUTO: 385 K/UL — SIGNIFICANT CHANGE UP (ref 150–400)
POTASSIUM SERPL-MCNC: 3.2 MMOL/L — LOW (ref 3.5–5.3)
POTASSIUM SERPL-SCNC: 3.2 MMOL/L — LOW (ref 3.5–5.3)
PROT SERPL-MCNC: 7 G/DL — SIGNIFICANT CHANGE UP (ref 6.6–8.7)
PROTHROM AB SERPL-ACNC: 11.9 SEC — SIGNIFICANT CHANGE UP (ref 9.5–13)
RBC # BLD: 4.62 M/UL — SIGNIFICANT CHANGE UP (ref 3.8–5.2)
RBC # FLD: 11.9 % — SIGNIFICANT CHANGE UP (ref 10.3–14.5)
SODIUM SERPL-SCNC: 140 MMOL/L — SIGNIFICANT CHANGE UP (ref 135–145)
T3 SERPL-MCNC: 102 NG/DL — SIGNIFICANT CHANGE UP (ref 80–200)
T4 AB SER-ACNC: 8.1 UG/DL — SIGNIFICANT CHANGE UP (ref 4.5–12)
TSH SERPL-MCNC: 2.09 UIU/ML — SIGNIFICANT CHANGE UP (ref 0.27–4.2)
WBC # BLD: 8.88 K/UL — SIGNIFICANT CHANGE UP (ref 3.8–10.5)
WBC # FLD AUTO: 8.88 K/UL — SIGNIFICANT CHANGE UP (ref 3.8–10.5)

## 2024-09-20 PROCEDURE — 93010 ELECTROCARDIOGRAM REPORT: CPT

## 2024-09-20 PROCEDURE — 84443 ASSAY THYROID STIM HORMONE: CPT

## 2024-09-20 PROCEDURE — 84480 ASSAY TRIIODOTHYRONINE (T3): CPT

## 2024-09-20 PROCEDURE — 86900 BLOOD TYPING SEROLOGIC ABO: CPT

## 2024-09-20 PROCEDURE — 83036 HEMOGLOBIN GLYCOSYLATED A1C: CPT

## 2024-09-20 PROCEDURE — 84436 ASSAY OF TOTAL THYROXINE: CPT

## 2024-09-20 PROCEDURE — 85025 COMPLETE CBC W/AUTO DIFF WBC: CPT

## 2024-09-20 PROCEDURE — 83540 ASSAY OF IRON: CPT

## 2024-09-20 PROCEDURE — G0463: CPT

## 2024-09-20 PROCEDURE — 93005 ELECTROCARDIOGRAM TRACING: CPT

## 2024-09-20 PROCEDURE — 85730 THROMBOPLASTIN TIME PARTIAL: CPT

## 2024-09-20 PROCEDURE — 36415 COLL VENOUS BLD VENIPUNCTURE: CPT

## 2024-09-20 PROCEDURE — 85610 PROTHROMBIN TIME: CPT

## 2024-09-20 PROCEDURE — 80053 COMPREHEN METABOLIC PANEL: CPT

## 2024-09-20 PROCEDURE — 86901 BLOOD TYPING SEROLOGIC RH(D): CPT

## 2024-09-20 PROCEDURE — 86850 RBC ANTIBODY SCREEN: CPT

## 2024-09-20 RX ORDER — VALSARTAN AND HYDROCHLOROTHIAZIDE 320; 12.5 MG/1; MG/1
1 TABLET, FILM COATED ORAL
Refills: 0 | DISCHARGE

## 2024-09-20 RX ORDER — TIRZEPATIDE 5 MG/.5ML
10 INJECTION, SOLUTION SUBCUTANEOUS
Refills: 0 | DISCHARGE

## 2024-09-20 RX ORDER — POTASSIUM CHLORIDE 10 MEQ
1 TABLET, EXT RELEASE, PARTICLES/CRYSTALS ORAL
Refills: 0 | DISCHARGE

## 2024-09-20 RX ORDER — AMLODIPINE BESYLATE 10 MG/1
1 TABLET ORAL
Refills: 0 | DISCHARGE

## 2024-09-20 NOTE — H&P PST ADULT - CARDIOVASCULAR
normal/regular rate and rhythm/S1 S2 present/no gallops/no rub/no murmur/no pedal edema/vascular negative

## 2024-09-20 NOTE — H&P PST ADULT - NSICDXPASTMEDICALHX_GEN_ALL_CORE_FT
PAST MEDICAL HISTORY:  DVT, lower extremity     HTN (hypertension)     Iron deficiency anemia     Obesity     Venous insufficiency (chronic) (peripheral)

## 2024-09-20 NOTE — H&P PST ADULT - PROBLEM SELECTOR PLAN 1
PST 9/20 in anticipation of RIGHT LEG STAB PHLEBECTOMY on 10/8/24 with ALEM POZO at [facility]. Patient educated on no shaving x 3 days prior to procedure, correct use of surgical scrub, NPO after MN,  preadmission instructions, day of procedure medications. Pt instructed to stop vitamins/supplements/herbal medications/ASA/NSAIDS for one week prior to surgery and discuss with PMD/PRESCIRBER/SPECIALIST any outstanding items or questions about prescriptions/medications. Written and oral instructions given to patient.   Continue BP meds as ordered by prescriber. Verbal and written instructions given to continue and take meds day of surgery with small sip of water at least 2 hours prior to start time. Instructions given on Mounjoro dosing.    LABS DONE TODAY IN PST: A1C CBC CMP PT INR T&S THYROID IRON EKG

## 2024-09-20 NOTE — H&P PST ADULT - PROBLEM SELECTOR PLAN 3
Currently on Mounjaro 10mg SUBQ Once weekly for weight loss. Instructed to hold dosing schedule prior to surgery  Last dose 9/19  Next dose 9/26  Skip dose 10/3  Procedure 10/8-  Can resume after procedure

## 2024-09-20 NOTE — H&P PST ADULT - PROBLEM SELECTOR PLAN 5
SURGICAL TEAM TO ADDRESS      Total Score [ 8    ]**    Caprini Score Greater than or =7: High risk, pharmocologic VTE prophylaxis indicated for most patients (in the absence of contraindications)

## 2024-09-20 NOTE — H&P PST ADULT - ASSESSMENT
50 y/o F seen in PST  in anticipation of RIGHT LEG STAB PHLEBECTOMY on 10/8/24 with ALEM POZO at [facility].  Reports many years of bulging varicosity to right leg.  On  under went GSV Varithena procedure with postop venous duplex demonstrating thrombus abutting the saphenofemoral junction. Repeat venous duplex demonstrates that thrombus is greater than 3 cm away from the saphenofemoral junction. She reports improvement in her leg pain and heaviness however still reports pain over the varicose vein on the medial aspect of her right leg that is persistent despite compression stocking use. Reports pain with palpation of bulge.    OPIOID RISK TOOL    VANDANA EACH BOX THAT APPLIES AND ADD TOTALS AT THE END    FAMILY HISTORY OF SUBSTANCE ABUSE                 FEMALE         MALE                                               Alcohol                             [  ]1 pt          [  ]3pts                                               Illegal Durgs                     [  ]2 pts        [  ]3pts                                               Rx Drugs                           [  ]4 pts        [  ]4 pts    PERSONAL HISTORY OF SUBSTANCE ABUSE                                                                                         Alcohol                             [  ]3 pts       [  ]3 pts                                               Illegal Durgs                     [  ]4 pts        [  ]4 pts                                               Rx Drugs                           [  ]5 pts        [  ]5 pts    AGE BETWEEN 16-45 YEARS                                      [  ]1 pt         [  ]1 pt    HISTORY OF PREADOLESCENT  SEXUAL ABUSE                                                             [  ]3 pts        [  ]0pts    PSYCHOLOGICAL DISEASE                     ADD, OCD, Bipolar, Schizophrenia        [  ]2 pts         [  ]2 pts                      Depression                                               [  ]1 pt           [  ]1 pt          Total:  0  A score of 3 or lower indicated LOW risk for future opiod abuse  A score of 4 to 7 indicated moderate risk for future opiod abuse  A score of 8 or higher indicates a high risk for opiod abuse       CAPRINI SCORE    AGE RELATED RISK FACTORS                                                             [ X] Age 41-60 years                                            (1 Point)  [ ] Age: 61-74 years                                           (2 Points)                 [ ] Age= 75 years                                                (3 Points)             DISEASE RELATED RISK FACTORS                                                       [ ] Edema in the lower extremities                 (1 Point)                     [X ] Varicose veins                                               (1 Point)                                 [ X] BMI > 25 Kg/m2                                            (1 Point)                                  [ ] Serious infection (ie PNA)                            (1 Point)                     [ ] Lung disease ( COPD, Emphysema)            (1 Point)                                                                          [ ] Acute myocardial infarction                         (1 Point)                  [ ] Congestive heart failure (in the previous month)  (1 Point)         [ ] Inflammatory bowel disease                            (1 Point)                  [ ] Central venous access, PICC or Port               (2 points)       (within the last month)                                                                [ ] Stroke (in the previous month)                        (5 Points)    [ ] Previous or present malignancy                       (2 points)                                                                                                                                                         HEMATOLOGY RELATED FACTORS                                                         [X ] Prior episodes of VTE                                     (3 Points)                     [ ] Positive family history for VTE                      (3 Points)                  [ ] Prothrombin 39057 A                                     (3 Points)                     [ ] Factor V Leiden                                                (3 Points)                        [ ] Lupus anticoagulants                                      (3 Points)                                                           [ ] Anticardiolipin antibodies                              (3 Points)                                                       [ ] High homocysteine in the blood                   (3 Points)                                             [ ] Other congenital or acquired thrombophilia      (3 Points)                                                [ ] Heparin induced thrombocytopenia                  (3 Points)                                        MOBILITY RELATED FACTORS  [ ] Bed rest                                                         (1 Point)  [ ] Plaster cast                                                    (2 points)  [ ] Bed bound for more than 72 hours           (2 Points)    GENDER SPECIFIC FACTORS  [ ] Pregnancy or had a baby within the last month   (1 Point)  [ ] Post-partum < 6 weeks                                   (1 Point)  [ ] Hormonal therapy  or oral contraception   (1 Point)  [ ] History of pregnancy complications              (1 point)  [ ] Unexplained or recurrent              (1 Point)    OTHER RISK FACTORS                                           (1 Point)  [ ] BMI >40, smoking, diabetes requiring insulin, chemotherapy  blood transfusions and length of surgery over 2 hours    SURGERY RELATED RISK FACTORS  [ ]  Section within the last month     (1 Point)  [ ] Minor surgery                                                  (1 Point)  [ ] Arthroscopic surgery                                       (2 Points)  [X ] Planned major surgery lasting more            (2 Points)      than 45 minutes     [ ] Elective hip or knee joint replacement       (5 points)       surgery                                                TRAUMA RELATED RISK FACTORS  [ ] Fracture of the hip, pelvis, or leg                       (5 Points)  [ ] Spinal cord injury resulting in paralysis             (5 points)       (in the previous month)    [ ] Paralysis  (less than 1 month)                             (5 Points)  [ ] Multiple Trauma within 1 month                        (5 Points)    Total Score [ 8    ]    Caprini Score 0-2: Low Risk, NO VTE prophylaxis required for most patients, encourage ambulation  Caprini Score 3-6: Moderate Risk , pharmacologic VTE prophylaxis is indicated for most patients (in the absence of contraindications)  Caprini Score Greater than or =7: High risk, pharmocologic VTE prophylaxis indicated for most patients (in the absence of contraindications)

## 2024-09-20 NOTE — H&P PST ADULT - PROBLEM SELECTOR PLAN 2
Continue BP meds as ordered by prescriber. Verbal and written instructions given to continue and take meds day of surgery with small sip of water at least 2 hours prior to start time   Sees Dr. Cortes (cardio) as her PCP.  Educated on possible need for medical clearance pending lab results/anesthesia review.

## 2024-09-20 NOTE — H&P PST ADULT - HISTORY OF PRESENT ILLNESS
VERONICA SOUZA is status post S/P RLE GSV Varithena and she is here for a post-op visit.   Surgery Date: 8/22/24 Patient s/p RLE GSV Varithena. Patient reports some relief of pain and pressure. Patient reorts decrease in size but continued bulging of varicosity. Patient continues to wear ace wraps as she reports they are more comfortable than compression stockings. Denies fevers, chills, LE swelling and wounds.       Interval History: Patient presents for postop visit. She recently underwent right GSV Varithena procedure with postop venous duplex demonstrating thrombus abutting the saphenofemoral junction. Repeat venous duplex demonstrates that thrombus is now greater than 3 cm away from the saphenofemoral junction. She reports improvement in her leg pain and heaviness however still reports pain over the varicose vein on the medial aspect of her right leg that is persistent despite compression stocking use.   49-year-old female with symptomatic C2 varicose veins of the right lower extremity. Now status post right GSV Varithena procedure.  She still reports pain over the varicose vein in the right medial aspect of her leg. Will schedule patient for ultrasound-guided sclerotherapy and stab phlebectomy in the OR. 10/8/2024.   Pleasant 49-year-old female. Longstanding history of pain and pressure./Compression pantyhose's. It provides some relief but still her legs feel painful at the end of the day. She has lost 60 pounds and has noticed a vein on her right medial thigh is more prominent. There is a family history of varicose veins with her mother having significant veins. She has had 2 childbirths   General Appearance: v appears well. Posterior tibialis: right 2+ and left 2+. Dorsalis pedis: right 2+ and left 2+.   Musculoskeletal: Right medial thigh large varicosity extending down to the right medial calf. Positive edema of the right lower extremity. Left lower extremity without edema and/or varicosities.     Assessment  Varicose veins with inflammation (454.1) (I83.10)    49-year-old female with C3 symptomatic varicose veins despite conservative management. She has greater saphenous vein reflux with associated refluxing varicosities. Believe she would benefit from ablation of right greater saphenous vein. Explained to her that subsequently her varicosities may shrink in her symptoms and may resolve if not we can consider ultrasound-guided sclerotherapy and/or stab phlebectomies as a adjunct procedures.   50 y/o F seen in PST 9/20 in anticipation of RIGHT LEG STAB PHLEBECTOMY on 10/8/24 with ALEM POZO at [facility].  Reports many years of bulging varicosity to right leg.  On 9/4 under went GSV Varithena procedure with postop venous duplex demonstrating thrombus abutting the saphenofemoral junction. Repeat venous duplex demonstrates that thrombus is now greater than 3 cm away from the saphenofemoral junction. She reports improvement in her leg pain and heaviness however still reports pain over the varicose vein on the medial aspect of her right leg that is persistent despite compression stocking use.     VERONICA SOUZA is status post S/P RLE GSV Varithena and she is here for a post-op visit.   Surgery Date: 8/22/24 Patient s/p RLE GSV Varithena. Patient reports some relief of pain and pressure. Patient reorts decrease in size but continued bulging of varicosity. Patient continues to wear ace wraps as she reports they are more comfortable than compression stockings. Denies fevers, chills, LE swelling and wounds.       Interval History: Patient presents for postop visit. She recently underwent right GSV Varithena procedure with postop venous duplex demonstrating thrombus abutting the saphenofemoral junction. Repeat venous duplex demonstrates that thrombus is now greater than 3 cm away from the saphenofemoral junction. She reports improvement in her leg pain and heaviness however still reports pain over the varicose vein on the medial aspect of her right leg that is persistent despite compression stocking use.   49-year-old female with symptomatic C2 varicose veins of the right lower extremity. Now status post right GSV Varithena procedure.  She still reports pain over the varicose vein in the right medial aspect of her leg. Will schedule patient for ultrasound-guided sclerotherapy and stab phlebectomy in the OR. 10/8/2024.   Pleasant 49-year-old female. Longstanding history of pain and pressure./Compression pantyhose's. It provides some relief but still her legs feel painful at the end of the day. She has lost 60 pounds and has noticed a vein on her right medial thigh is more prominent. There is a family history of varicose veins with her mother having significant veins. She has had 2 childbirths   General Appearance: v appears well. Posterior tibialis: right 2+ and left 2+. Dorsalis pedis: right 2+ and left 2+.   Musculoskeletal: Right medial thigh large varicosity extending down to the right medial calf. Positive edema of the right lower extremity. Left lower extremity without edema and/or varicosities.     Assessment  Varicose veins with inflammation (454.1) (I83.10)    49-year-old female with C3 symptomatic varicose veins despite conservative management. She has greater saphenous vein reflux with associated refluxing varicosities. Believe she would benefit from ablation of right greater saphenous vein. Explained to her that subsequently her varicosities may shrink in her symptoms and may resolve if not we can consider ultrasound-guided sclerotherapy and/or stab phlebectomies as a adjunct procedures.   50 y/o F seen in PST 9/20 in anticipation of RIGHT LEG STAB PHLEBECTOMY on 10/8/24 with ALEM POZO at [facility].  Reports many years of bulging varicosity to right leg.  On 9/4 under went GSV Varithena procedure with postop venous duplex demonstrating thrombus abutting the saphenofemoral junction. Repeat venous duplex demonstrates that thrombus is greater than 3 cm away from the saphenofemoral junction. She reports improvement in her leg pain and heaviness however still reports pain over the varicose vein on the medial aspect of her right leg that is persistent despite compression stocking use. Reports pain with palpation of bulge.

## 2024-09-27 ENCOUNTER — APPOINTMENT (OUTPATIENT)
Dept: CARDIOLOGY | Facility: CLINIC | Age: 49
End: 2024-09-27
Payer: COMMERCIAL

## 2024-09-27 ENCOUNTER — RX RENEWAL (OUTPATIENT)
Age: 49
End: 2024-09-27

## 2024-09-27 ENCOUNTER — NON-APPOINTMENT (OUTPATIENT)
Age: 49
End: 2024-09-27

## 2024-09-27 VITALS
HEART RATE: 90 BPM | HEIGHT: 64 IN | WEIGHT: 180 LBS | BODY MASS INDEX: 30.73 KG/M2 | OXYGEN SATURATION: 97 % | SYSTOLIC BLOOD PRESSURE: 128 MMHG | DIASTOLIC BLOOD PRESSURE: 80 MMHG

## 2024-09-27 DIAGNOSIS — I10 ESSENTIAL (PRIMARY) HYPERTENSION: ICD-10-CM

## 2024-09-27 DIAGNOSIS — R07.89 OTHER CHEST PAIN: ICD-10-CM

## 2024-09-27 DIAGNOSIS — R20.0 ANESTHESIA OF SKIN: ICD-10-CM

## 2024-09-27 DIAGNOSIS — G47.33 OBSTRUCTIVE SLEEP APNEA (ADULT) (PEDIATRIC): ICD-10-CM

## 2024-09-27 DIAGNOSIS — R03.0 ELEVATED BLOOD-PRESSURE READING, W/OUT DIAGNOSIS OF HYPERTENSION: ICD-10-CM

## 2024-09-27 DIAGNOSIS — E66.01 MORBID (SEVERE) OBESITY DUE TO EXCESS CALORIES: ICD-10-CM

## 2024-09-27 DIAGNOSIS — R06.09 OTHER FORMS OF DYSPNEA: ICD-10-CM

## 2024-09-27 PROBLEM — E66.9 OBESITY, UNSPECIFIED: Chronic | Status: ACTIVE | Noted: 2024-09-20

## 2024-09-27 PROBLEM — D50.9 IRON DEFICIENCY ANEMIA, UNSPECIFIED: Chronic | Status: ACTIVE | Noted: 2024-09-20

## 2024-09-27 PROBLEM — I82.409 ACUTE EMBOLISM AND THROMBOSIS OF UNSPECIFIED DEEP VEINS OF UNSPECIFIED LOWER EXTREMITY: Chronic | Status: ACTIVE | Noted: 2024-09-20

## 2024-09-27 PROBLEM — I87.2 VENOUS INSUFFICIENCY (CHRONIC) (PERIPHERAL): Chronic | Status: ACTIVE | Noted: 2024-09-20

## 2024-09-27 PROCEDURE — 93000 ELECTROCARDIOGRAM COMPLETE: CPT

## 2024-09-27 PROCEDURE — G2211 COMPLEX E/M VISIT ADD ON: CPT | Mod: NC

## 2024-09-27 PROCEDURE — 99215 OFFICE O/P EST HI 40 MIN: CPT

## 2024-09-27 RX ORDER — MAGNESIUM OXIDE 241.3 MG/1000MG
400 TABLET ORAL DAILY
Qty: 60 | Refills: 1 | Status: ACTIVE | COMMUNITY
Start: 2024-09-27 | End: 1900-01-01

## 2024-09-27 RX ORDER — ALPRAZOLAM 0.25 MG/1
0.25 TABLET ORAL
Qty: 3 | Refills: 0 | Status: ACTIVE | COMMUNITY
Start: 2024-09-27 | End: 1900-01-01

## 2024-09-27 NOTE — DISCUSSION/SUMMARY
[Patient] : the patient [Risks] : risks [Benefits] : benefits [Alternatives] : alternatives [With Me] : with me [___ Month(s)] : in [unfilled] month(s) [EKG obtained to assist in diagnosis and management of assessed problem(s)] : EKG obtained to assist in diagnosis and management of assessed problem(s) [FreeTextEntry1] : This is a 49 year old woman withe headaches, left arm numbness with dyspnea on exertion  1) Anxiety:/depression: escitalopram. 10 mg daily.  psychiatry referral.   2 hypertension controlled.   ct norvasc 5, valsartan 160 and HCTZ 25 mg daily.    3) dyspnea on exertion:  resolved.    4) venous insufficiency:  Reflux in legs  vascular surgery for vein ablation varicose veins  6) Cramping in the legs:   . coq10 and magnesium. . compression stockings not covered by insurance.  6)  obesity : significant weight liost.  50 lbs.  Mounjaro  7) abdominal rash : patient has this skin folds in abdomen after lostng weight.  rcomlains of rash: recommend plastic surgery or bariatric surgeon   Will order and review ECG for the above mentioned diagnosis/condition/symptoms

## 2024-09-27 NOTE — HISTORY OF PRESENT ILLNESS
[FreeTextEntry1] : dyspnea on exertion, left leg cramp, and difficulty in vision  HPI for today:  9 27 2024:  she is here  as she has some conflict with her husbnand and low of stress.  and lot of house problems.   she has palpitations.   she has the venous ablation.  on the rigth leg.  3 nights ago. 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   he had varicose vein surgery .  and palning for varivcose vein procedure.  plan for Pre-operative cardiovascular risk evaluation and management for oct 8 2024  patietn complains of the abdominal pain.  and also compains of rash under her abdomainal skin  brusie on thr right kneee.   and also complains of right write cut.   old note:   she lost 55 lbs.  and she has this thick skin  she is takign mounjaro and no side effects.  she has not started exercises.   she has a rash in her belly. adn she has difficulty passing urine.  she lsot this weight and not tone up.      old note:  she has problems with iron.  low iron.  she has gained a lot of weight.     has back pain. she had heavy menstrual bleeding. and hormonal treatment.  adn now no more significan tmenstrual bleeding.   and now iron better no chest pain . no dyspnea on exertion    ol dnote:  she has a lot of stress. she is depressed  .  she was very anxious.  she needs meds for hel;p.    old note: feels good. had episode of cramps in the legs. pain while walking no dyspnea. no chest pain.   old note: : gained weight.  no chest pain. no headaches. no dizziness. no chest pain and no dyspnea.  no palpitations.   old note: state her varicose veins of right leg burst. she c/o pulsatile mass on the leg that is intermittent.  she has chest pain intermittent. not on exertion. Also has some heart burn.   old note:  patient dd not lose weight she feels better with BP meds. migraine headches.  headaches improved significantly.

## 2024-09-27 NOTE — CARDIOLOGY SUMMARY
[No Ischemia] : no Ischemia [No Exercise Ind Arr] : no exercise induced arrhythmias [No Symptoms] : no Symptoms [___] : [unfilled] [LVEF ___%] : LVEF [unfilled]% [Normal] : normal LA size [None] : no mitral regurgitation [de-identified] : 9 27 2024:   Sinus Rhythm  Low voltage in precordial leads.  Poor r wave progression ABNORMAL   7 2 2024:    Sinus Rhythm  Low voltage in precordial leads.  ABNORMAL   12 14 2021 Sinus  Rhythm  WITHIN NORMAL LIMITS  8/4/2021  Sinus  Rhythm   non specific ST T changes.    [de-identified] : US carotid: midl atherosclerosis. no stenosis

## 2024-10-02 DIAGNOSIS — F41.8 OTHER SPECIFIED ANXIETY DISORDERS: ICD-10-CM

## 2024-10-02 RX ORDER — FLUOXETINE HYDROCHLORIDE 10 MG/1
10 TABLET ORAL DAILY
Qty: 30 | Refills: 1 | Status: ACTIVE | COMMUNITY
Start: 2024-10-02 | End: 1900-01-01

## 2024-10-02 RX ORDER — ALPRAZOLAM 0.25 MG/1
0.25 TABLET ORAL
Qty: 15 | Refills: 0 | Status: ACTIVE | COMMUNITY
Start: 2024-10-02 | End: 1900-01-01

## 2024-10-08 ENCOUNTER — APPOINTMENT (OUTPATIENT)
Dept: VASCULAR SURGERY | Facility: HOSPITAL | Age: 49
End: 2024-10-08

## 2024-10-09 ENCOUNTER — OFFICE (OUTPATIENT)
Dept: URBAN - METROPOLITAN AREA CLINIC 115 | Facility: CLINIC | Age: 49
Setting detail: OPHTHALMOLOGY
End: 2024-10-09
Payer: COMMERCIAL

## 2024-10-09 DIAGNOSIS — H52.4: ICD-10-CM

## 2024-10-09 DIAGNOSIS — H16.223: ICD-10-CM

## 2024-10-09 DIAGNOSIS — H35.033: ICD-10-CM

## 2024-10-09 PROCEDURE — 92004 COMPRE OPH EXAM NEW PT 1/>: CPT | Performed by: OPTOMETRIST

## 2024-10-09 PROCEDURE — 92015 DETERMINE REFRACTIVE STATE: CPT | Performed by: OPTOMETRIST

## 2024-10-09 PROCEDURE — 92250 FUNDUS PHOTOGRAPHY W/I&R: CPT | Performed by: OPTOMETRIST

## 2024-10-09 ASSESSMENT — REFRACTION_MANIFEST
OD_VA1: 20/20
OS_VA1: 20/20
OD_SPHERE: +0.25
OS_AXIS: 095
OD_AXIS: 000
OD_CYLINDER: 0.00
OS_SPHERE: +0.25
OS_ADD: +2.25
OS_CYLINDER: -0.25
OD_ADD: +2.25

## 2024-10-09 ASSESSMENT — CONFRONTATIONAL VISUAL FIELD TEST (CVF)
OS_FINDINGS: FULL
OD_FINDINGS: FULL

## 2024-10-09 ASSESSMENT — SUPERFICIAL PUNCTATE KERATITIS (SPK)
OS_SPK: 1+
OD_SPK: 1+

## 2024-10-09 ASSESSMENT — VISUAL ACUITY
OD_BCVA: 20/20
OS_BCVA: 20/20-1

## 2024-10-09 ASSESSMENT — REFRACTION_CURRENTRX
OS_AXIS: 180
OD_SPHERE: +1.50
OS_SPHERE: +1.50
OD_AXIS: 180
OS_OVR_VA: 20/
OD_VPRISM_DIRECTION: SV
OS_CYLINDER: 0.00
OS_VPRISM_DIRECTION: SV
OD_CYLINDER: 0.00
OD_OVR_VA: 20/

## 2024-10-09 ASSESSMENT — REFRACTION_AUTOREFRACTION
OS_CYLINDER: -0.75
OD_SPHERE: +0.50
OD_AXIS: 060
OS_AXIS: 095
OD_CYLINDER: -0.25
OS_SPHERE: +0.75

## 2024-10-09 ASSESSMENT — TONOMETRY: OD_IOP_MMHG: 21

## 2024-10-11 ENCOUNTER — APPOINTMENT (OUTPATIENT)
Dept: GASTROENTEROLOGY | Facility: CLINIC | Age: 49
End: 2024-10-11
Payer: COMMERCIAL

## 2024-10-11 VITALS
SYSTOLIC BLOOD PRESSURE: 126 MMHG | WEIGHT: 175 LBS | DIASTOLIC BLOOD PRESSURE: 110 MMHG | BODY MASS INDEX: 29.88 KG/M2 | HEIGHT: 64 IN | HEART RATE: 82 BPM | OXYGEN SATURATION: 98 %

## 2024-10-11 DIAGNOSIS — K62.5 HEMORRHAGE OF ANUS AND RECTUM: ICD-10-CM

## 2024-10-11 DIAGNOSIS — Z80.0 FAMILY HISTORY OF MALIGNANT NEOPLASM OF DIGESTIVE ORGANS: ICD-10-CM

## 2024-10-11 DIAGNOSIS — Z12.11 ENCOUNTER FOR SCREENING FOR MALIGNANT NEOPLASM OF COLON: ICD-10-CM

## 2024-10-11 DIAGNOSIS — Z13.9 ENCOUNTER FOR SCREENING, UNSPECIFIED: ICD-10-CM

## 2024-10-11 PROCEDURE — 99204 OFFICE O/P NEW MOD 45 MIN: CPT

## 2024-10-11 RX ORDER — SODIUM SULFATE, POTASSIUM SULFATE AND MAGNESIUM SULFATE 1.6; 3.13; 17.5 G/177ML; G/177ML; G/177ML
17.5-3.13-1.6 SOLUTION ORAL
Qty: 1 | Refills: 0 | Status: ACTIVE | COMMUNITY
Start: 2024-10-11 | End: 1900-01-01

## 2024-10-16 RX ORDER — FLUOXETINE HYDROCHLORIDE 10 MG/1
10 CAPSULE ORAL
Qty: 30 | Refills: 1 | Status: ACTIVE | COMMUNITY
Start: 2024-10-16 | End: 1900-01-01

## 2024-10-23 PROBLEM — Z01.818 PRE-OP EXAM: Status: ACTIVE | Noted: 2024-10-23

## 2024-10-24 ENCOUNTER — APPOINTMENT (OUTPATIENT)
Dept: SURGERY | Facility: CLINIC | Age: 49
End: 2024-10-24

## 2024-10-24 VITALS
OXYGEN SATURATION: 95 % | RESPIRATION RATE: 16 BRPM | DIASTOLIC BLOOD PRESSURE: 73 MMHG | WEIGHT: 170.2 LBS | HEIGHT: 64 IN | HEART RATE: 83 BPM | SYSTOLIC BLOOD PRESSURE: 110 MMHG | TEMPERATURE: 98.4 F | BODY MASS INDEX: 29.06 KG/M2

## 2024-10-24 DIAGNOSIS — L98.7 EXCESSIVE AND REDUNDANT SKIN AND SUBCUTANEOUS TISSUE: ICD-10-CM

## 2024-10-24 DIAGNOSIS — Z01.818 ENCOUNTER FOR OTHER PREPROCEDURAL EXAMINATION: ICD-10-CM

## 2024-10-24 PROCEDURE — 99204 OFFICE O/P NEW MOD 45 MIN: CPT

## 2024-11-05 NOTE — ASSESSMENT
[FreeTextEntry1] : Ms Melvin presents to the office with a right posterior thrombosed external hemorrhoid. I have discussed the etiology for this condition including excess straining with activity or straining to evacuate stools secondary to constipation or diarrhea. In office today, we proceeded to excise the ALMA to good effect. The patient was advised on what to expect post procedure. This includes some mild discomfort which should be readily alleviated by over-the-counter pain medications. Sitz baths will need to be performed to keep the wound site clean to facilitate healing. There will be some mild serosanguineous drainage to be anticipated and this should resolve as the wound heals. Cotton gauze should be placed in undergarments to prevent soilage from drainage. A bowel regimen consisting of a high fiber diet, ample daily water intake and miralax as needed is recommended to prevent further episodes of straining and further episodes of thrombosis. She was also advised to avoid heavy lifting and straining for the next two days to prevent reaccumulation of thrombus.  Worsening bleeding from the site can be addressed by laying in the left lateral decubitus position while holding pressure at the site.\par \par Followup with Dr. Alcantara for surveillance colonoscopy.\par \par 10/13/21 Ms. Melvin returns to the office with concerns for recurrent ALMA. At yesterday's site of excision, there is noted to be edematous tissue without e/o recurrent thrombus. Reassured of negative findings, and that no additional intervention is necessary. Avoid straining and heavy lifting, continue bowel regimen with miralax, sitz baths for comfort and ice packs as needed to reduce swelling. S1 S2 present

## 2024-11-07 ENCOUNTER — RX CHANGE (OUTPATIENT)
Age: 49
End: 2024-11-07

## 2024-11-21 ENCOUNTER — APPOINTMENT (OUTPATIENT)
Dept: GASTROENTEROLOGY | Facility: GI CENTER | Age: 49
End: 2024-11-21
Payer: COMMERCIAL

## 2024-11-21 ENCOUNTER — TRANSCRIPTION ENCOUNTER (OUTPATIENT)
Age: 49
End: 2024-11-21

## 2024-11-21 ENCOUNTER — OUTPATIENT (OUTPATIENT)
Dept: OUTPATIENT SERVICES | Facility: HOSPITAL | Age: 49
LOS: 1 days | End: 2024-11-21
Payer: COMMERCIAL

## 2024-11-21 DIAGNOSIS — Z80.0 FAMILY HISTORY OF MALIGNANT NEOPLASM OF DIGESTIVE ORGANS: ICD-10-CM

## 2024-11-21 DIAGNOSIS — Z98.890 OTHER SPECIFIED POSTPROCEDURAL STATES: Chronic | ICD-10-CM

## 2024-11-21 DIAGNOSIS — Z12.11 ENCOUNTER FOR SCREENING FOR MALIGNANT NEOPLASM OF COLON: ICD-10-CM

## 2024-11-21 PROCEDURE — G0105: CPT

## 2024-11-21 PROCEDURE — 45378 DIAGNOSTIC COLONOSCOPY: CPT | Mod: PT

## 2024-12-05 ENCOUNTER — RX CHANGE (OUTPATIENT)
Age: 49
End: 2024-12-05

## 2024-12-12 ENCOUNTER — NON-APPOINTMENT (OUTPATIENT)
Age: 49
End: 2024-12-12

## 2024-12-12 ENCOUNTER — APPOINTMENT (OUTPATIENT)
Dept: CARDIOLOGY | Facility: CLINIC | Age: 49
End: 2024-12-12

## 2024-12-12 ENCOUNTER — APPOINTMENT (OUTPATIENT)
Dept: CARDIOLOGY | Facility: CLINIC | Age: 49
End: 2024-12-12
Payer: COMMERCIAL

## 2024-12-12 VITALS
OXYGEN SATURATION: 98 % | WEIGHT: 159 LBS | SYSTOLIC BLOOD PRESSURE: 120 MMHG | HEART RATE: 89 BPM | DIASTOLIC BLOOD PRESSURE: 80 MMHG | BODY MASS INDEX: 27.14 KG/M2 | HEIGHT: 64 IN

## 2024-12-12 DIAGNOSIS — Z01.810 ENCOUNTER FOR PREPROCEDURAL CARDIOVASCULAR EXAMINATION: ICD-10-CM

## 2024-12-12 DIAGNOSIS — E66.3 OVERWEIGHT: ICD-10-CM

## 2024-12-12 DIAGNOSIS — R00.2 PALPITATIONS: ICD-10-CM

## 2024-12-12 DIAGNOSIS — R06.09 OTHER FORMS OF DYSPNEA: ICD-10-CM

## 2024-12-12 PROCEDURE — 99214 OFFICE O/P EST MOD 30 MIN: CPT | Mod: 25

## 2024-12-12 PROCEDURE — 93306 TTE W/DOPPLER COMPLETE: CPT

## 2024-12-12 PROCEDURE — 93000 ELECTROCARDIOGRAM COMPLETE: CPT

## 2024-12-12 RX ORDER — TIRZEPATIDE 7.5 MG/.5ML
7.5 INJECTION, SOLUTION SUBCUTANEOUS
Qty: 4 | Refills: 1 | Status: ACTIVE | COMMUNITY
Start: 2024-12-12 | End: 1900-01-01

## 2024-12-16 ENCOUNTER — NON-APPOINTMENT (OUTPATIENT)
Age: 49
End: 2024-12-16

## 2025-01-05 ENCOUNTER — INPATIENT (INPATIENT)
Facility: HOSPITAL | Age: 50
LOS: 2 days | Discharge: HOME | DRG: 908 | End: 2025-01-08
Attending: PLASTIC SURGERY | Admitting: PLASTIC SURGERY
Payer: COMMERCIAL

## 2025-01-05 VITALS
OXYGEN SATURATION: 98 % | TEMPERATURE: 98 F | RESPIRATION RATE: 20 BRPM | SYSTOLIC BLOOD PRESSURE: 124 MMHG | DIASTOLIC BLOOD PRESSURE: 70 MMHG | HEART RATE: 105 BPM | HEIGHT: 66 IN | WEIGHT: 158.07 LBS

## 2025-01-05 DIAGNOSIS — T81.321A DISRUPTION OR DEHISCENCE OF CLOSURE OF INTERNAL OPERATION (SURGICAL) WOUND OF ABDOMINAL WALL MUSCLE OR FASCIA, INITIAL ENCOUNTER: ICD-10-CM

## 2025-01-05 DIAGNOSIS — Z98.890 OTHER SPECIFIED POSTPROCEDURAL STATES: Chronic | ICD-10-CM

## 2025-01-05 LAB
ALBUMIN SERPL ELPH-MCNC: 4 G/DL — SIGNIFICANT CHANGE UP (ref 3.3–5.2)
ALP SERPL-CCNC: 80 U/L — SIGNIFICANT CHANGE UP (ref 40–120)
ALT FLD-CCNC: 16 U/L — SIGNIFICANT CHANGE UP
ANION GAP SERPL CALC-SCNC: 15 MMOL/L — SIGNIFICANT CHANGE UP (ref 5–17)
APTT BLD: 36.2 SEC — HIGH (ref 24.5–35.6)
AST SERPL-CCNC: 17 U/L — SIGNIFICANT CHANGE UP
BASOPHILS # BLD AUTO: 0.04 K/UL — SIGNIFICANT CHANGE UP (ref 0–0.2)
BASOPHILS NFR BLD AUTO: 0.4 % — SIGNIFICANT CHANGE UP (ref 0–2)
BILIRUB SERPL-MCNC: 0.2 MG/DL — LOW (ref 0.4–2)
BLD GP AB SCN SERPL QL: SIGNIFICANT CHANGE UP
BUN SERPL-MCNC: 11 MG/DL — SIGNIFICANT CHANGE UP (ref 8–20)
CALCIUM SERPL-MCNC: 9.8 MG/DL — SIGNIFICANT CHANGE UP (ref 8.4–10.5)
CHLORIDE SERPL-SCNC: 98 MMOL/L — SIGNIFICANT CHANGE UP (ref 96–108)
CO2 SERPL-SCNC: 23 MMOL/L — SIGNIFICANT CHANGE UP (ref 22–29)
CREAT SERPL-MCNC: 0.73 MG/DL — SIGNIFICANT CHANGE UP (ref 0.5–1.3)
EGFR: 101 ML/MIN/1.73M2 — SIGNIFICANT CHANGE UP
EOSINOPHIL # BLD AUTO: 0.12 K/UL — SIGNIFICANT CHANGE UP (ref 0–0.5)
EOSINOPHIL NFR BLD AUTO: 1.3 % — SIGNIFICANT CHANGE UP (ref 0–6)
GAS PNL BLDV: SIGNIFICANT CHANGE UP
GLUCOSE SERPL-MCNC: 78 MG/DL — SIGNIFICANT CHANGE UP (ref 70–99)
HCG SERPL-ACNC: <4 MIU/ML — SIGNIFICANT CHANGE UP
HCT VFR BLD CALC: 43.3 % — SIGNIFICANT CHANGE UP (ref 34.5–45)
HGB BLD-MCNC: 14.5 G/DL — SIGNIFICANT CHANGE UP (ref 11.5–15.5)
IMM GRANULOCYTES NFR BLD AUTO: 0.5 % — SIGNIFICANT CHANGE UP (ref 0–0.9)
INR BLD: 0.99 RATIO — SIGNIFICANT CHANGE UP (ref 0.85–1.16)
LYMPHOCYTES # BLD AUTO: 1.89 K/UL — SIGNIFICANT CHANGE UP (ref 1–3.3)
LYMPHOCYTES # BLD AUTO: 19.9 % — SIGNIFICANT CHANGE UP (ref 13–44)
MCHC RBC-ENTMCNC: 31.4 PG — SIGNIFICANT CHANGE UP (ref 27–34)
MCHC RBC-ENTMCNC: 33.5 G/DL — SIGNIFICANT CHANGE UP (ref 32–36)
MCV RBC AUTO: 93.7 FL — SIGNIFICANT CHANGE UP (ref 80–100)
MONOCYTES # BLD AUTO: 0.47 K/UL — SIGNIFICANT CHANGE UP (ref 0–0.9)
MONOCYTES NFR BLD AUTO: 4.9 % — SIGNIFICANT CHANGE UP (ref 2–14)
NEUTROPHILS # BLD AUTO: 6.95 K/UL — SIGNIFICANT CHANGE UP (ref 1.8–7.4)
NEUTROPHILS NFR BLD AUTO: 73 % — SIGNIFICANT CHANGE UP (ref 43–77)
PLATELET # BLD AUTO: 605 K/UL — HIGH (ref 150–400)
POTASSIUM SERPL-MCNC: 4.1 MMOL/L — SIGNIFICANT CHANGE UP (ref 3.5–5.3)
POTASSIUM SERPL-SCNC: 4.1 MMOL/L — SIGNIFICANT CHANGE UP (ref 3.5–5.3)
PROT SERPL-MCNC: 7.4 G/DL — SIGNIFICANT CHANGE UP (ref 6.6–8.7)
PROTHROM AB SERPL-ACNC: 11.5 SEC — SIGNIFICANT CHANGE UP (ref 9.9–13.4)
RBC # BLD: 4.62 M/UL — SIGNIFICANT CHANGE UP (ref 3.8–5.2)
RBC # FLD: 11.9 % — SIGNIFICANT CHANGE UP (ref 10.3–14.5)
SODIUM SERPL-SCNC: 135 MMOL/L — SIGNIFICANT CHANGE UP (ref 135–145)
WBC # BLD: 9.52 K/UL — SIGNIFICANT CHANGE UP (ref 3.8–10.5)
WBC # FLD AUTO: 9.52 K/UL — SIGNIFICANT CHANGE UP (ref 3.8–10.5)

## 2025-01-05 PROCEDURE — 74177 CT ABD & PELVIS W/CONTRAST: CPT | Mod: 26

## 2025-01-05 PROCEDURE — 99285 EMERGENCY DEPT VISIT HI MDM: CPT

## 2025-01-05 RX ORDER — CEFTRIAXONE SODIUM 1 G/1
1000 INJECTION, POWDER, FOR SOLUTION INTRAMUSCULAR; INTRAVENOUS ONCE
Refills: 0 | Status: COMPLETED | OUTPATIENT
Start: 2025-01-05 | End: 2025-01-05

## 2025-01-05 RX ORDER — VANCOMYCIN HYDROCHLORIDE 5 G/100ML
1000 INJECTION, POWDER, LYOPHILIZED, FOR SOLUTION INTRAVENOUS EVERY 12 HOURS
Refills: 0 | Status: DISCONTINUED | OUTPATIENT
Start: 2025-01-05 | End: 2025-01-06

## 2025-01-05 RX ORDER — CEFTRIAXONE SODIUM 1 G/1
1000 INJECTION, POWDER, FOR SOLUTION INTRAMUSCULAR; INTRAVENOUS EVERY 24 HOURS
Refills: 0 | Status: DISCONTINUED | OUTPATIENT
Start: 2025-01-05 | End: 2025-01-05

## 2025-01-05 RX ORDER — SODIUM CHLORIDE 9 MG/ML
1000 INJECTION, SOLUTION INTRAMUSCULAR; INTRAVENOUS; SUBCUTANEOUS
Refills: 0 | Status: DISCONTINUED | OUTPATIENT
Start: 2025-01-05 | End: 2025-01-06

## 2025-01-05 RX ORDER — VALSARTAN 80 MG/1
160 TABLET ORAL DAILY
Refills: 0 | Status: DISCONTINUED | OUTPATIENT
Start: 2025-01-05 | End: 2025-01-06

## 2025-01-05 RX ORDER — VANCOMYCIN HYDROCHLORIDE 5 G/100ML
1000 INJECTION, POWDER, LYOPHILIZED, FOR SOLUTION INTRAVENOUS ONCE
Refills: 0 | Status: COMPLETED | OUTPATIENT
Start: 2025-01-05 | End: 2025-01-05

## 2025-01-05 RX ORDER — ONDANSETRON 4 MG/1
4 TABLET ORAL EVERY 6 HOURS
Refills: 0 | Status: DISCONTINUED | OUTPATIENT
Start: 2025-01-05 | End: 2025-01-06

## 2025-01-05 RX ORDER — ACETAMINOPHEN 80 MG/.8ML
650 SOLUTION/ DROPS ORAL EVERY 6 HOURS
Refills: 0 | Status: DISCONTINUED | OUTPATIENT
Start: 2025-01-05 | End: 2025-01-06

## 2025-01-05 RX ORDER — VANCOMYCIN HYDROCHLORIDE 5 G/100ML
750 INJECTION, POWDER, LYOPHILIZED, FOR SOLUTION INTRAVENOUS EVERY 12 HOURS
Refills: 0 | Status: DISCONTINUED | OUTPATIENT
Start: 2025-01-05 | End: 2025-01-05

## 2025-01-05 RX ORDER — IBUPROFEN 200 MG
600 TABLET ORAL EVERY 6 HOURS
Refills: 0 | Status: DISCONTINUED | OUTPATIENT
Start: 2025-01-05 | End: 2025-01-06

## 2025-01-05 RX ORDER — ENOXAPARIN SODIUM 60 MG/.6ML
40 INJECTION INTRAVENOUS; SUBCUTANEOUS EVERY 24 HOURS
Refills: 0 | Status: DISCONTINUED | OUTPATIENT
Start: 2025-01-05 | End: 2025-01-06

## 2025-01-05 RX ORDER — INFLUENZA A VIRUS A/WISCONSIN/588/2019 (H1N1) RECOMBINANT HEMAGGLUTININ ANTIGEN, INFLUENZA A VIRUS A/DARWIN/6/2021 (H3N2) RECOMBINANT HEMAGGLUTININ ANTIGEN, INFLUENZA B VIRUS B/AUSTRIA/1359417/2021 RECOMBINANT HEMAGGLUTININ ANTIGEN, AND INFLUENZA B VIRUS B/PHUKET/3073/2013 RECOMBINANT HEMAGGLUTININ ANTIGEN 45; 45; 45; 45 UG/.5ML; UG/.5ML; UG/.5ML; UG/.5ML
0.5 INJECTION INTRAMUSCULAR ONCE
Refills: 0 | Status: DISCONTINUED | OUTPATIENT
Start: 2025-01-05 | End: 2025-01-08

## 2025-01-05 RX ORDER — SENNOSIDES 8.6 MG/1
2 TABLET, FILM COATED ORAL AT BEDTIME
Refills: 0 | Status: DISCONTINUED | OUTPATIENT
Start: 2025-01-05 | End: 2025-01-06

## 2025-01-05 RX ORDER — SODIUM CHLORIDE 9 MG/ML
1500 INJECTION, SOLUTION INTRAMUSCULAR; INTRAVENOUS; SUBCUTANEOUS ONCE
Refills: 0 | Status: COMPLETED | OUTPATIENT
Start: 2025-01-05 | End: 2025-01-05

## 2025-01-05 RX ORDER — CEFTRIAXONE SODIUM 1 G/1
1000 INJECTION, POWDER, FOR SOLUTION INTRAMUSCULAR; INTRAVENOUS ONCE
Refills: 0 | Status: DISCONTINUED | OUTPATIENT
Start: 2025-01-05 | End: 2025-01-05

## 2025-01-05 RX ORDER — CEFTRIAXONE SODIUM 1 G/1
1000 INJECTION, POWDER, FOR SOLUTION INTRAMUSCULAR; INTRAVENOUS EVERY 24 HOURS
Refills: 0 | Status: DISCONTINUED | OUTPATIENT
Start: 2025-01-06 | End: 2025-01-06

## 2025-01-05 RX ADMIN — VANCOMYCIN HYDROCHLORIDE 250 MILLIGRAM(S): 5 INJECTION, POWDER, LYOPHILIZED, FOR SOLUTION INTRAVENOUS at 17:47

## 2025-01-05 RX ADMIN — SODIUM CHLORIDE 1500 MILLILITER(S): 9 INJECTION, SOLUTION INTRAMUSCULAR; INTRAVENOUS; SUBCUTANEOUS at 17:47

## 2025-01-05 RX ADMIN — CEFTRIAXONE SODIUM 1000 MILLIGRAM(S): 1 INJECTION, POWDER, FOR SOLUTION INTRAMUSCULAR; INTRAVENOUS at 17:46

## 2025-01-05 RX ADMIN — SODIUM CHLORIDE 120 MILLILITER(S): 9 INJECTION, SOLUTION INTRAMUSCULAR; INTRAVENOUS; SUBCUTANEOUS at 21:23

## 2025-01-05 NOTE — ED ADULT TRIAGE NOTE - CHIEF COMPLAINT QUOTE
12/23/24 pt had  a tummy tuck after 90 lb weight loss. here with increased drainage from surgical incision and decreased drainage to ELLE drains. generalized weakness

## 2025-01-05 NOTE — H&P ADULT - ATTENDING COMMENTS
Agree with above assessment.  Plan for admission and IV abx.  Follow up CT scan results.  NPO after midnight.

## 2025-01-05 NOTE — ED PROVIDER NOTE - OBJECTIVE STATEMENT
Pt is a 50 yo female s/p tummy robertvlad sx 12/23/2024 presenting with wound check. Pt is here with increased drainage from surgical incision and decreased drainage to ELLE drains and generalized weakness for the past few days. Surgery team is aware of pt. Pt denies fever, chest pain, SOB, abdominal pain, N/V/D, urinary symptoms.

## 2025-01-05 NOTE — ED PROVIDER NOTE - CLINICAL SUMMARY MEDICAL DECISION MAKING FREE TEXT BOX
Pt is a 50 yo female s/p tummy tuck sx 12/23/2024 presenting with wound check. Pt is here with increased drainage from surgical incision and decreased drainage to ELLE drains and generalized weakness for the past few days. Surgery team is aware of pt. Pt denies fever, chest pain, SOB, abdominal pain, N/V/D, urinary symptoms.    vs stable, mild tachy. exam shows purulent drainage in transverse lower abd surgical site with dehiscence. surgical team aware of pt, recommending preop labs, vanc, ceftriaxone, ct abd pelvis with iv contrast. also obtaining vbg, blood culture, given ns bolus. Pt is a 48 yo female s/p tummy tuck sx 12/23/2024 presenting with wound check. Pt is here with increased drainage from surgical incision and decreased drainage to ELLE drains and generalized weakness for the past few days. Surgery team is aware of pt. Pt denies fever, chest pain, SOB, abdominal pain, N/V/D, urinary symptoms.    vs stable, mild tachy. exam shows purulent drainage in transverse lower abd surgical site with dehiscence. surgical team aware of pt, recommending preop labs, vanc, ceftriaxone, ct abd pelvis with iv contrast. also obtaining vbg, blood culture, given ns bolus. admitted to surgery.

## 2025-01-05 NOTE — ED PROVIDER NOTE - NSICDXPASTMEDICALHX_GEN_ALL_CORE_FT
PAST MEDICAL HISTORY:  DVT, lower extremity     HTN (hypertension)     Iron deficiency anemia     Obesity     Venous insufficiency (chronic) (peripheral)      Patient/Caregiver provided printed discharge information.

## 2025-01-05 NOTE — ED PROVIDER NOTE - PHYSICAL EXAMINATION
General: NAD  HEENT: Normocephalic, atraumatic  Neck: No apparent stiffness or JVD  Pulm: Chest wall symmetric and nontender, lungs clear to ascultation   Cardiac: Regular rate and regular rhythm  Abdomen: Nontender and nondistended, purulent drainage and dehiscence noted from abd surgical site, hawk drain site wnl.  Skin: Skin is warm, dry and intact without rashes or lesions.  Neuro: No motor or sensory deficits above reported baseline  MSK: No deformity or tenderness above reported baseline General: NAD  HEENT: Normocephalic, atraumatic  Neck: No apparent stiffness or JVD  Pulm: Chest wall symmetric and nontender, lungs clear to ascultation   Cardiac: Regular rate and regular rhythm  Abdomen: Nontender and nondistended, purulent drainage and dehiscence noted from abd surgical site, hawk drain site wnl.  Skin: Abdominal incision site demonstrating dehiscence with purulent drainage, erythema, and induration.    Neuro: No motor or sensory deficits above reported baseline  MSK: No deformity or tenderness above reported baseline

## 2025-01-05 NOTE — H&P ADULT - HISTORY OF PRESENT ILLNESS
HPI: 49y Female with PMH significant for HTN, DVT, and obesity with significant weightloss >80lbs, now s/p panniculectomy with abdominoplasty and flank liposuction on 12/27/2024 with Dr. Vega presents to the ED complaining of worsening wound drainage and pain. Patient states that she was well post operatively until 4 days prior to presentation when she began to notice increase in size to her lower abdomen, foul smelling drainage pain and redness. Patient states that the drains have also stopped putting out significantly since her follow up in the clinic. She denies fever, chills or night sweats but endorses generalized weakness/fatigue. Labs on presentation grossly normal without evidence of leukocytosis of significant electrolyte derangements VSS and patient afebrile on surgical evaluation. CT A/P with IV contrast pending for further evaluation. Patient has no other issues or complaints at this time.       PAST MEDICAL & SURGICAL HISTORY:  Obesity      HTN (hypertension)      Venous insufficiency (chronic) (peripheral)      DVT, lower extremity      Iron deficiency anemia      History of D&C      H/O varicose vein procedure        Home Meds: Home Medications:  amLODIPine 10 mg oral tablet: 1 tab(s) orally once a day (20 Sep 2024 17:12)  Mounjaro 10 mg/0.5 mL subcutaneous solution: 10 milligram(s) subcutaneously once a week (20 Sep 2024 17:13)  potassium chloride: 1 tab(s) once a day (20 Sep 2024 17:13)  valsartan-hydrochlorothiazide 160 mg-25 mg oral tablet: 1 tab(s) orally once a day (20 Sep 2024 17:11)    Allergies: Allergies    No Known Allergies    Intolerances      Soc:   Advanced Directives: Presumed Full Code     CURRENT MEDICATIONS:   --------------------------------------------------------------------------------------  Neurologic Medications  acetaminophen     Tablet .. 650 milliGRAM(s) Oral every 6 hours  ibuprofen  Tablet. 600 milliGRAM(s) Oral every 6 hours PRN Temp greater or equal to 38C (100.4F), Mild Pain (1 - 3)  ondansetron Injectable 4 milliGRAM(s) IV Push every 6 hours PRN Nausea    Respiratory Medications    Cardiovascular Medications    Gastrointestinal Medications  senna 2 Tablet(s) Oral at bedtime PRN Constipation  sodium chloride 0.9%. 1000 milliLiter(s) IV Continuous <Continuous>    Genitourinary Medications    Hematologic/Oncologic Medications  enoxaparin Injectable 40 milliGRAM(s) SubCutaneous every 24 hours    Antimicrobial/Immunologic Medications  cefTRIAXone   IVPB 1000 milliGRAM(s) IV Intermittent every 24 hours  vancomycin  IVPB 750 milliGRAM(s) IV Intermittent every 12 hours    Endocrine/Metabolic Medications    Topical/Other Medications    --------------------------------------------------------------------------------------    VITAL SIGNS, INS/OUTS (last 24 hours):  --------------------------------------------------------------------------------------  ICU Vital Signs Last 24 Hrs  T(C): 36.6 (05 Jan 2025 16:17), Max: 36.6 (05 Jan 2025 16:17)  T(F): 97.9 (05 Jan 2025 16:17), Max: 97.9 (05 Jan 2025 16:17)  HR: 105 (05 Jan 2025 16:17) (105 - 105)  BP: 124/70 (05 Jan 2025 16:17) (124/70 - 124/70)  BP(mean): --  ABP: --  ABP(mean): --  RR: 20 (05 Jan 2025 16:17) (20 - 20)  SpO2: 98% (05 Jan 2025 16:17) (98% - 98%)    O2 Parameters below as of 05 Jan 2025 16:17  Patient On (Oxygen Delivery Method): room air          I&O's Summary    --------------------------------------------------------------------------------------    EXAM:  Constitutional: patient appears comfortable resting in bed, in no apparent distress  Respiratory: respirations are unlabored, no accessory muscle use, no conversational dyspnea  Cardiovascular: regular rate & rhythm  Gastrointestinal: abdomen is soft & non-distended, tender to palpation over incision, erythema and induration with mild drainage, bilateral flank ELLE drains intact holding good suction, scant seropurulent output insitu  Neurological: GCS15, A&O x 3  Skin: mucous membranes moist, lower abdominal incision with superficial discontinuity, serous drainage, mild odor, erythematous and induration spreading just beyond wound edge over mons pubis    LABS  --------------------------------------------------------------------------------------  Labs:  CAPILLARY BLOOD GLUCOSE                              14.5   9.52  )-----------( 605      ( 05 Jan 2025 17:07 )             43.3       Auto Neutrophil %: 73.0 % (01-05-25 @ 17:07)  Auto Immature Granulocyte %: 0.5 % (01-05-25 @ 17:07)    01-05    135  |  98  |  11.0  ----------------------------<  78  4.1   |  23.0  |  0.73      Calcium: 9.8 mg/dL (01-05-25 @ 17:07)      LFTs:             7.4  | 0.2  | 17       ------------------[80      ( 05 Jan 2025 17:07 )  4.0  | x    | 16               Blood Gas Venous - Lactate: 1.90 mmol/L (01-05-25 @ 17:07)      Coags:     11.5   ----< 0.99    ( 05 Jan 2025 17:07 )     36.2

## 2025-01-05 NOTE — ED ADULT NURSE NOTE - OBJECTIVE STATEMENT
pt to ED with c/o wound check. hx tummy tuck 12/23/24. reports since sx she has been experiencing night sweats, intermittent abdominal pain, drainage from site, dec drainage from hawk drain, and generalized weakness. pt A+O x3. RR even and unlabored on RA. yellow/red drainage noted from surgical site with dehiscence.

## 2025-01-05 NOTE — ED ADULT NURSE NOTE - NS ED NURSE PATIENT LEFT UNIT TIME
As per Daniela Faust PA, Pt. appeared to posssibly have postules in bone, not contagious.  Pt. treated with TB meds.  PP/RN Benadryl IV ordered 20:58

## 2025-01-05 NOTE — H&P ADULT - ASSESSMENT
49y Female with PMH significant for HTN, DVT, and obesity with significant weightloss >80lbs, now s/p panniculectomy with abdominoplasty and flank liposuction on 12/27/2024 with Dr. Vega presents to the ED complaining of worsening wound drainage and pain. Patient states that she was well post operatively until 4 days prior to presentation when she began to notice increase in size to her lower abdomen, foul smelling drainage pain and redness. Patient states that the drains have also stopped putting out significantly since her follow up in the clinic. She denies fever, chills or night sweats but endorses generalized weakness/fatigue. Labs on presentation grossly normal without evidence of leukocytosis of significant electrolyte derangements VSS and patient afebrile on surgical evaluation. CT A/P with IV contrast pending for further evaluation. Patient has no other issues or complaints at this time.    Plan:  - Admit to Plastic Surgery Service (floor)  - Remain NPO except medications for now  - Ensure adequate analgesics  - IV antibiotics (Ceftriaxone & Vancomycin)  - AM labs  - F/u CT A/P to assess for possible collection  - DVT ppx: SCDs & LVX  - Continue home medications as appropriate  - Further plans to follow    Patient discussed with Dr. Vega 49y Female with PMH significant for HTN, DVT, and obesity with significant weightloss >80lbs, now s/p panniculectomy with abdominoplasty and flank liposuction on 12/27/2024 with Dr. Vega presents to the ED complaining of worsening wound drainage and pain. Patient states that she was well post operatively until 4 days prior to presentation when she began to notice increase in size to her lower abdomen, foul smelling drainage pain and redness. Patient states that the drains have also stopped putting out significantly since her follow up in the clinic. She denies fever, chills or night sweats but endorses generalized weakness/fatigue. Labs on presentation grossly normal without evidence of leukocytosis of significant electrolyte derangements VSS and patient afebrile on surgical evaluation. CT A/P with IV contrast pending for further evaluation. Patient has no other issues or complaints at this time.    Plan:  - Admit to Plastic Surgery Service (floor)  - Remain NPO except medications for now  - Ensure adequate analgesics  - IV antibiotics (Ceftriaxone & Vancomycin)  - AM labs  - F/u CT A/P to assess for possible collection  - DVT ppx: SCDs & LVX  - Continue home medications as appropriate  - Further plans to follow    Patient discussed with Dr. Vega     Addendum to plan:  - CT scan showed no obvious collection or hematoma however patient would benefit from washout and revision of incision  - Patient to be booked for abdominal wall washout for tomorrow 01/06/2025  - To remain NPO  - Further plans to follow post operatively

## 2025-01-06 ENCOUNTER — TRANSCRIPTION ENCOUNTER (OUTPATIENT)
Age: 50
End: 2025-01-06

## 2025-01-06 LAB
ALBUMIN SERPL ELPH-MCNC: 2.8 G/DL — LOW (ref 3.3–5.2)
ALP SERPL-CCNC: 63 U/L — SIGNIFICANT CHANGE UP (ref 40–120)
ALT FLD-CCNC: 10 U/L — SIGNIFICANT CHANGE UP
ANION GAP SERPL CALC-SCNC: 11 MMOL/L — SIGNIFICANT CHANGE UP (ref 5–17)
AST SERPL-CCNC: 9 U/L — SIGNIFICANT CHANGE UP
BASOPHILS # BLD AUTO: 0.05 K/UL — SIGNIFICANT CHANGE UP (ref 0–0.2)
BASOPHILS NFR BLD AUTO: 0.6 % — SIGNIFICANT CHANGE UP (ref 0–2)
BILIRUB SERPL-MCNC: 0.2 MG/DL — LOW (ref 0.4–2)
BUN SERPL-MCNC: 9.9 MG/DL — SIGNIFICANT CHANGE UP (ref 8–20)
CALCIUM SERPL-MCNC: 8.1 MG/DL — LOW (ref 8.4–10.5)
CHLORIDE SERPL-SCNC: 107 MMOL/L — SIGNIFICANT CHANGE UP (ref 96–108)
CO2 SERPL-SCNC: 21 MMOL/L — LOW (ref 22–29)
CREAT SERPL-MCNC: 0.72 MG/DL — SIGNIFICANT CHANGE UP (ref 0.5–1.3)
EGFR: 102 ML/MIN/1.73M2 — SIGNIFICANT CHANGE UP
EOSINOPHIL # BLD AUTO: 0.2 K/UL — SIGNIFICANT CHANGE UP (ref 0–0.5)
EOSINOPHIL NFR BLD AUTO: 2.5 % — SIGNIFICANT CHANGE UP (ref 0–6)
GLUCOSE SERPL-MCNC: 78 MG/DL — SIGNIFICANT CHANGE UP (ref 70–99)
HCT VFR BLD CALC: 33.6 % — LOW (ref 34.5–45)
HGB BLD-MCNC: 11.4 G/DL — LOW (ref 11.5–15.5)
IMM GRANULOCYTES NFR BLD AUTO: 0.5 % — SIGNIFICANT CHANGE UP (ref 0–0.9)
LYMPHOCYTES # BLD AUTO: 2.19 K/UL — SIGNIFICANT CHANGE UP (ref 1–3.3)
LYMPHOCYTES # BLD AUTO: 27.4 % — SIGNIFICANT CHANGE UP (ref 13–44)
MCHC RBC-ENTMCNC: 32.5 PG — SIGNIFICANT CHANGE UP (ref 27–34)
MCHC RBC-ENTMCNC: 33.9 G/DL — SIGNIFICANT CHANGE UP (ref 32–36)
MCV RBC AUTO: 95.7 FL — SIGNIFICANT CHANGE UP (ref 80–100)
MONOCYTES # BLD AUTO: 0.58 K/UL — SIGNIFICANT CHANGE UP (ref 0–0.9)
MONOCYTES NFR BLD AUTO: 7.3 % — SIGNIFICANT CHANGE UP (ref 2–14)
NEUTROPHILS # BLD AUTO: 4.94 K/UL — SIGNIFICANT CHANGE UP (ref 1.8–7.4)
NEUTROPHILS NFR BLD AUTO: 61.7 % — SIGNIFICANT CHANGE UP (ref 43–77)
PLATELET # BLD AUTO: 458 K/UL — HIGH (ref 150–400)
POTASSIUM SERPL-MCNC: 4.2 MMOL/L — SIGNIFICANT CHANGE UP (ref 3.5–5.3)
POTASSIUM SERPL-SCNC: 4.2 MMOL/L — SIGNIFICANT CHANGE UP (ref 3.5–5.3)
PROT SERPL-MCNC: 5.2 G/DL — LOW (ref 6.6–8.7)
RBC # BLD: 3.51 M/UL — LOW (ref 3.8–5.2)
RBC # FLD: 12 % — SIGNIFICANT CHANGE UP (ref 10.3–14.5)
SODIUM SERPL-SCNC: 139 MMOL/L — SIGNIFICANT CHANGE UP (ref 135–145)
WBC # BLD: 8 K/UL — SIGNIFICANT CHANGE UP (ref 3.8–10.5)
WBC # FLD AUTO: 8 K/UL — SIGNIFICANT CHANGE UP (ref 3.8–10.5)

## 2025-01-06 PROCEDURE — 88304 TISSUE EXAM BY PATHOLOGIST: CPT | Mod: 26

## 2025-01-06 RX ORDER — SODIUM CHLORIDE 9 MG/ML
1000 INJECTION, SOLUTION INTRAVENOUS
Refills: 0 | Status: DISCONTINUED | OUTPATIENT
Start: 2025-01-06 | End: 2025-01-07

## 2025-01-06 RX ORDER — FENTANYL 75 UG/H
25 PATCH, EXTENDED RELEASE TRANSDERMAL
Refills: 0 | Status: DISCONTINUED | OUTPATIENT
Start: 2025-01-06 | End: 2025-01-07

## 2025-01-06 RX ORDER — HYDROMORPHONE HCL 4 MG
0.5 TABLET ORAL
Refills: 0 | Status: DISCONTINUED | OUTPATIENT
Start: 2025-01-06 | End: 2025-01-07

## 2025-01-06 RX ORDER — ONDANSETRON 4 MG/1
4 TABLET ORAL ONCE
Refills: 0 | Status: DISCONTINUED | OUTPATIENT
Start: 2025-01-06 | End: 2025-01-07

## 2025-01-06 RX ADMIN — VANCOMYCIN HYDROCHLORIDE 250 MILLIGRAM(S): 5 INJECTION, POWDER, LYOPHILIZED, FOR SOLUTION INTRAVENOUS at 18:21

## 2025-01-06 RX ADMIN — ACETAMINOPHEN 650 MILLIGRAM(S): 80 SOLUTION/ DROPS ORAL at 01:16

## 2025-01-06 RX ADMIN — SODIUM CHLORIDE 120 MILLILITER(S): 9 INJECTION, SOLUTION INTRAMUSCULAR; INTRAVENOUS; SUBCUTANEOUS at 00:17

## 2025-01-06 RX ADMIN — ACETAMINOPHEN 650 MILLIGRAM(S): 80 SOLUTION/ DROPS ORAL at 00:16

## 2025-01-06 RX ADMIN — ACETAMINOPHEN 650 MILLIGRAM(S): 80 SOLUTION/ DROPS ORAL at 12:33

## 2025-01-06 RX ADMIN — Medication 0.5 MILLIGRAM(S): at 21:49

## 2025-01-06 RX ADMIN — CEFTRIAXONE SODIUM 1000 MILLIGRAM(S): 1 INJECTION, POWDER, FOR SOLUTION INTRAMUSCULAR; INTRAVENOUS at 18:21

## 2025-01-06 RX ADMIN — VANCOMYCIN HYDROCHLORIDE 250 MILLIGRAM(S): 5 INJECTION, POWDER, LYOPHILIZED, FOR SOLUTION INTRAVENOUS at 05:41

## 2025-01-06 RX ADMIN — ACETAMINOPHEN 650 MILLIGRAM(S): 80 SOLUTION/ DROPS ORAL at 13:33

## 2025-01-06 RX ADMIN — Medication 0.5 MILLIGRAM(S): at 22:04

## 2025-01-06 RX ADMIN — ACETAMINOPHEN 650 MILLIGRAM(S): 80 SOLUTION/ DROPS ORAL at 05:41

## 2025-01-06 RX ADMIN — VALSARTAN 160 MILLIGRAM(S): 80 TABLET ORAL at 05:40

## 2025-01-06 NOTE — ASU PREOP CHECKLIST - TYPE OF SOLUTION
Procedures:  Nerve conduction studies of bilateral upper and lower extremities performed today.  EMG evaluation of bilateral upper extremities was performed.  The patient refused EMG evaluation of bilateral lower extremities.          Gracie Parnell MD  Medicine-Neurology, Clinical Neurophysiology   [facility]

## 2025-01-06 NOTE — ASU PREOP CHECKLIST - ORDERS/MEDICATION ADMINISTRATION RECORD ON CHART
With bug bites, I'd recommend taking a Zyrtec or Claritin, 10 mg, I'll send a prescription to see if this is covered, if not it is available over the counter.     In addition to this, you can use some topical steroid, I'll send triamcinolone to the pharmacy.     I'd probably be intentional about bug spray in the evening hours with this kiddo as well.    done

## 2025-01-07 ENCOUNTER — TRANSCRIPTION ENCOUNTER (OUTPATIENT)
Age: 50
End: 2025-01-07

## 2025-01-07 LAB
ANION GAP SERPL CALC-SCNC: 12 MMOL/L — SIGNIFICANT CHANGE UP (ref 5–17)
BASOPHILS # BLD AUTO: 0.05 K/UL — SIGNIFICANT CHANGE UP (ref 0–0.2)
BASOPHILS NFR BLD AUTO: 0.5 % — SIGNIFICANT CHANGE UP (ref 0–2)
BUN SERPL-MCNC: 8.5 MG/DL — SIGNIFICANT CHANGE UP (ref 8–20)
CALCIUM SERPL-MCNC: 7.8 MG/DL — LOW (ref 8.4–10.5)
CHLORIDE SERPL-SCNC: 103 MMOL/L — SIGNIFICANT CHANGE UP (ref 96–108)
CO2 SERPL-SCNC: 22 MMOL/L — SIGNIFICANT CHANGE UP (ref 22–29)
CREAT SERPL-MCNC: 0.63 MG/DL — SIGNIFICANT CHANGE UP (ref 0.5–1.3)
EGFR: 109 ML/MIN/1.73M2 — SIGNIFICANT CHANGE UP
EOSINOPHIL # BLD AUTO: 0.12 K/UL — SIGNIFICANT CHANGE UP (ref 0–0.5)
EOSINOPHIL NFR BLD AUTO: 1.2 % — SIGNIFICANT CHANGE UP (ref 0–6)
GLUCOSE SERPL-MCNC: 136 MG/DL — HIGH (ref 70–99)
HCT VFR BLD CALC: 32.3 % — LOW (ref 34.5–45)
HGB BLD-MCNC: 10.8 G/DL — LOW (ref 11.5–15.5)
IMM GRANULOCYTES NFR BLD AUTO: 0.4 % — SIGNIFICANT CHANGE UP (ref 0–0.9)
LYMPHOCYTES # BLD AUTO: 1.75 K/UL — SIGNIFICANT CHANGE UP (ref 1–3.3)
LYMPHOCYTES # BLD AUTO: 18.2 % — SIGNIFICANT CHANGE UP (ref 13–44)
MAGNESIUM SERPL-MCNC: 2.2 MG/DL — SIGNIFICANT CHANGE UP (ref 1.6–2.6)
MCHC RBC-ENTMCNC: 31.9 PG — SIGNIFICANT CHANGE UP (ref 27–34)
MCHC RBC-ENTMCNC: 33.4 G/DL — SIGNIFICANT CHANGE UP (ref 32–36)
MCV RBC AUTO: 95.3 FL — SIGNIFICANT CHANGE UP (ref 80–100)
MONOCYTES # BLD AUTO: 0.52 K/UL — SIGNIFICANT CHANGE UP (ref 0–0.9)
MONOCYTES NFR BLD AUTO: 5.4 % — SIGNIFICANT CHANGE UP (ref 2–14)
NEUTROPHILS # BLD AUTO: 7.15 K/UL — SIGNIFICANT CHANGE UP (ref 1.8–7.4)
NEUTROPHILS NFR BLD AUTO: 74.3 % — SIGNIFICANT CHANGE UP (ref 43–77)
PHOSPHATE SERPL-MCNC: 3.6 MG/DL — SIGNIFICANT CHANGE UP (ref 2.4–4.7)
PLATELET # BLD AUTO: 479 K/UL — HIGH (ref 150–400)
POTASSIUM SERPL-MCNC: 3.7 MMOL/L — SIGNIFICANT CHANGE UP (ref 3.5–5.3)
POTASSIUM SERPL-SCNC: 3.7 MMOL/L — SIGNIFICANT CHANGE UP (ref 3.5–5.3)
RBC # BLD: 3.39 M/UL — LOW (ref 3.8–5.2)
RBC # FLD: 11.9 % — SIGNIFICANT CHANGE UP (ref 10.3–14.5)
SODIUM SERPL-SCNC: 137 MMOL/L — SIGNIFICANT CHANGE UP (ref 135–145)
VANCOMYCIN TROUGH SERPL-MCNC: 8.9 UG/ML — LOW (ref 10–20)
WBC # BLD: 9.63 K/UL — SIGNIFICANT CHANGE UP (ref 3.8–10.5)
WBC # FLD AUTO: 9.63 K/UL — SIGNIFICANT CHANGE UP (ref 3.8–10.5)

## 2025-01-07 RX ORDER — SENNOSIDES 8.6 MG/1
2 TABLET, FILM COATED ORAL AT BEDTIME
Refills: 0 | Status: DISCONTINUED | OUTPATIENT
Start: 2025-01-07 | End: 2025-01-08

## 2025-01-07 RX ORDER — SODIUM CHLORIDE 9 MG/ML
1000 INJECTION, SOLUTION INTRAVENOUS ONCE
Refills: 0 | Status: COMPLETED | OUTPATIENT
Start: 2025-01-07 | End: 2025-01-07

## 2025-01-07 RX ORDER — VALSARTAN 80 MG/1
160 TABLET ORAL DAILY
Refills: 0 | Status: DISCONTINUED | OUTPATIENT
Start: 2025-01-07 | End: 2025-01-08

## 2025-01-07 RX ORDER — OXYCODONE HCL 15 MG
1 TABLET ORAL
Qty: 10 | Refills: 0
Start: 2025-01-07 | End: 2025-01-09

## 2025-01-07 RX ORDER — VANCOMYCIN HYDROCHLORIDE 5 G/100ML
1000 INJECTION, POWDER, LYOPHILIZED, FOR SOLUTION INTRAVENOUS EVERY 12 HOURS
Refills: 0 | Status: DISCONTINUED | OUTPATIENT
Start: 2025-01-07 | End: 2025-01-07

## 2025-01-07 RX ORDER — ENOXAPARIN SODIUM 60 MG/.6ML
40 INJECTION INTRAVENOUS; SUBCUTANEOUS EVERY 24 HOURS
Refills: 0 | Status: DISCONTINUED | OUTPATIENT
Start: 2025-01-07 | End: 2025-01-08

## 2025-01-07 RX ORDER — OXYCODONE HCL 15 MG
10 TABLET ORAL EVERY 4 HOURS
Refills: 0 | Status: DISCONTINUED | OUTPATIENT
Start: 2025-01-07 | End: 2025-01-08

## 2025-01-07 RX ORDER — AMOXICILLIN/POTASSIUM CLAV 875-125 MG
875 TABLET ORAL
Qty: 14 | Refills: 0
Start: 2025-01-07 | End: 2025-01-13

## 2025-01-07 RX ORDER — ACETAMINOPHEN 80 MG/.8ML
2 SOLUTION/ DROPS ORAL
Qty: 0 | Refills: 0 | DISCHARGE
Start: 2025-01-07

## 2025-01-07 RX ORDER — SODIUM CHLORIDE 9 MG/ML
1000 INJECTION, SOLUTION INTRAVENOUS
Refills: 0 | Status: DISCONTINUED | OUTPATIENT
Start: 2025-01-07 | End: 2025-01-07

## 2025-01-07 RX ORDER — VANCOMYCIN HYDROCHLORIDE 5 G/100ML
750 INJECTION, POWDER, LYOPHILIZED, FOR SOLUTION INTRAVENOUS EVERY 12 HOURS
Refills: 0 | Status: DISCONTINUED | OUTPATIENT
Start: 2025-01-07 | End: 2025-01-07

## 2025-01-07 RX ORDER — HYDROMORPHONE HCL 4 MG
0.5 TABLET ORAL
Refills: 0 | Status: DISCONTINUED | OUTPATIENT
Start: 2025-01-07 | End: 2025-01-07

## 2025-01-07 RX ORDER — OXYCODONE HCL 15 MG
5 TABLET ORAL EVERY 4 HOURS
Refills: 0 | Status: DISCONTINUED | OUTPATIENT
Start: 2025-01-07 | End: 2025-01-08

## 2025-01-07 RX ORDER — ACETAMINOPHEN 80 MG/.8ML
650 SOLUTION/ DROPS ORAL EVERY 6 HOURS
Refills: 0 | Status: DISCONTINUED | OUTPATIENT
Start: 2025-01-07 | End: 2025-01-08

## 2025-01-07 RX ORDER — CEFTRIAXONE SODIUM 1 G/1
1000 INJECTION, POWDER, FOR SOLUTION INTRAMUSCULAR; INTRAVENOUS EVERY 24 HOURS
Refills: 0 | Status: DISCONTINUED | OUTPATIENT
Start: 2025-01-07 | End: 2025-01-08

## 2025-01-07 RX ORDER — VANCOMYCIN HYDROCHLORIDE 5 G/100ML
1250 INJECTION, POWDER, LYOPHILIZED, FOR SOLUTION INTRAVENOUS EVERY 12 HOURS
Refills: 0 | Status: DISCONTINUED | OUTPATIENT
Start: 2025-01-07 | End: 2025-01-08

## 2025-01-07 RX ORDER — SENNOSIDES 8.6 MG/1
2 TABLET, FILM COATED ORAL
Qty: 0 | Refills: 0 | DISCHARGE
Start: 2025-01-07

## 2025-01-07 RX ORDER — OXYCODONE HCL 15 MG
1 TABLET ORAL
Qty: 8 | Refills: 0
Start: 2025-01-07 | End: 2025-01-08

## 2025-01-07 RX ORDER — IBUPROFEN 200 MG
600 TABLET ORAL EVERY 6 HOURS
Refills: 0 | Status: DISCONTINUED | OUTPATIENT
Start: 2025-01-07 | End: 2025-01-08

## 2025-01-07 RX ORDER — ONDANSETRON 4 MG/1
4 TABLET ORAL ONCE
Refills: 0 | Status: DISCONTINUED | OUTPATIENT
Start: 2025-01-07 | End: 2025-01-07

## 2025-01-07 RX ORDER — IBUPROFEN 200 MG
1 TABLET ORAL
Qty: 0 | Refills: 0 | DISCHARGE
Start: 2025-01-07

## 2025-01-07 RX ADMIN — ACETAMINOPHEN 650 MILLIGRAM(S): 80 SOLUTION/ DROPS ORAL at 23:04

## 2025-01-07 RX ADMIN — Medication 10 MILLIGRAM(S): at 16:31

## 2025-01-07 RX ADMIN — ENOXAPARIN SODIUM 40 MILLIGRAM(S): 60 INJECTION INTRAVENOUS; SUBCUTANEOUS at 05:15

## 2025-01-07 RX ADMIN — ACETAMINOPHEN 650 MILLIGRAM(S): 80 SOLUTION/ DROPS ORAL at 05:14

## 2025-01-07 RX ADMIN — ACETAMINOPHEN 650 MILLIGRAM(S): 80 SOLUTION/ DROPS ORAL at 17:23

## 2025-01-07 RX ADMIN — Medication 10 MILLIGRAM(S): at 15:06

## 2025-01-07 RX ADMIN — VANCOMYCIN HYDROCHLORIDE 166.67 MILLIGRAM(S): 5 INJECTION, POWDER, LYOPHILIZED, FOR SOLUTION INTRAVENOUS at 17:20

## 2025-01-07 RX ADMIN — Medication 600 MILLIGRAM(S): at 05:13

## 2025-01-07 RX ADMIN — Medication 600 MILLIGRAM(S): at 06:13

## 2025-01-07 RX ADMIN — ACETAMINOPHEN 650 MILLIGRAM(S): 80 SOLUTION/ DROPS ORAL at 06:13

## 2025-01-07 RX ADMIN — ACETAMINOPHEN 650 MILLIGRAM(S): 80 SOLUTION/ DROPS ORAL at 18:09

## 2025-01-07 RX ADMIN — SODIUM CHLORIDE 1000 MILLILITER(S): 9 INJECTION, SOLUTION INTRAVENOUS at 17:16

## 2025-01-07 RX ADMIN — CEFTRIAXONE SODIUM 1000 MILLIGRAM(S): 1 INJECTION, POWDER, FOR SOLUTION INTRAMUSCULAR; INTRAVENOUS at 17:19

## 2025-01-07 NOTE — DISCHARGE NOTE PROVIDER - CARE PROVIDER_API CALL
Iain Vega  Plastic Surgery  [address] [facility] [address]  [place] [address]  Phone: [phone]  Fax: [phone]  Established Patient  Scheduled Appointment: 01/09/2025

## 2025-01-07 NOTE — DISCHARGE NOTE PROVIDER - NSDCCPTREATMENT_GEN_ALL_CORE_FT
PRINCIPAL PROCEDURE  Procedure: Revision, abdominoplasty  Findings and Treatment: Follow up: Please call and make an appointment with the Dr Vega 1//9/24. Also, please call and make an appointment with your primary care physician as per your usual schedule.   Activity: May return to normal activities as tolerated, however refrain from heavy lifting > 10-15 lbs. Avoid putting strain on the sutures, keep abdomen bent to 45 degree while in bed and walking.   Diet: May continue regular diet.  Medications: Please take all medications listed on your discharge paperwork as prescribed. Pain medication has been prescribed for you. Please, take it as it has been prescribed, do not drive or operate heavy machinery while taking narcotics.  You are encouraged to take over-the-counter tylenol and/or ibuprofen for pain relief when you feel your pain no longer warrants the use of narcotic pain medications. Please continue to take the antibiotics prescribed.   Wound Care: Please, keep wound site clean and dry. You may shower, but do not bathe.  You will be discharged with a ELLE drain. You will need to empty the drain and record output accurately. This will be taught to you by the nursing staff. Please do not remove the drain. It will be removed in the office. Please bring to the office accurate records of output.   Patient is advised to RETURN TO THE EMERGENCY DEPARTMENT for any of the following - worsening pain, fever/chills, nausea/vomiting, altered mental status, chest pain, shortness of breath, or any other new / worsening symptom.

## 2025-01-07 NOTE — DISCHARGE NOTE NURSING/CASE MANAGEMENT/SOCIAL WORK - FINANCIAL ASSISTANCE
[facility] provides services at a reduced cost to those who are determined to be eligible through [facility]’s financial assistance program. Information regarding [facility]’s financial assistance program can be found by going to [email or web address] or by calling [phone].

## 2025-01-07 NOTE — PHARMACOTHERAPY INTERVENTION NOTE - COMMENTS
Level on 1/7/25 resulted at 8.9 while patient on 1000mG Q12hr regimen- this correlates to AUC of 380 (goal -600). Based on subtherapeutic AUC/MIC24-based monitoring, regimen adjusted to 1250mG Q12hr, which correlates to a projected AUC of 539. Will continue to follow and make adjustments as necessary per therapeutic monitoring.     d/w nursing & clinical pharmacy

## 2025-01-07 NOTE — CHART NOTE - NSCHARTNOTEFT_GEN_A_CORE
Post-op check:    Pt is a 50 yo F s/p  closure of wound dehiscence, exploration and washout, neoumbilicus revision on 1/6.     Vitals:  Vital Signs Last 24 Hrs  T(C): 36.4 (06 Jan 2025 23:50), Max: 36.7 (06 Jan 2025 08:09)  T(F): 97.6 (06 Jan 2025 23:50), Max: 98.1 (06 Jan 2025 08:09)  HR: 80 (06 Jan 2025 23:50) (69 - 92)  BP: 115/79 (06 Jan 2025 23:50) (98/67 - 115/79)  BP(mean): --  RR: 18 (06 Jan 2025 23:50) (12 - 20)  SpO2: 94% (06 Jan 2025 23:50) (91% - 100%)    Patient resting in bed. Pt states she has some mild pain at this time. Denies nausea, vomiting, SOB, chest pain.    Physical exam:  General: alert, patient resting in bed comfortably, in NAD  Resp: equal chest rise bilaterally, nonlabored breathing  Abdomen: soft, NT, ND, lower abdomen/injuinal region w/ c/d/i silver mupilex dressing in place, b/l purvi drains in the wound bed,  with a serosanguinous fluid  Extremities: moving all extremities spontaneously    50 yo F s/p  closure of wound dehiscence, exploration and washout, neoumbilicus revision on 1/6. No acute distress, sleeping comfortably.     Plan:   - cont ab  -continue diet as tolerated  -pain control PRN  -encourage OOB, ambulation  -DVT ppx: SCDs  - IS use

## 2025-01-07 NOTE — DISCHARGE NOTE PROVIDER - HOSPITAL COURSE
HPI:  HPI: 49y Female with PMH significant for HTN, DVT, and obesity with significant weightloss >80lbs, now s/p panniculectomy with abdominoplasty and flank liposuction on 12/27/2024 with Dr. Vega presents to the ED complaining of worsening wound drainage and pain. Patient states that she was well post operatively until 4 days prior to presentation when she began to notice increase in size to her lower abdomen, foul smelling drainage pain and redness. Patient states that the drains have also stopped putting out significantly since her follow up in the clinic. She denies fever, chills or night sweats but endorses generalized weakness/fatigue. Labs on presentation grossly normal without evidence of leukocytosis of significant electrolyte derangements VSS and patient afebrile on surgical evaluation. CT A/P with IV contrast pending for further evaluation. Patient has no other issues or complaints at this time.     Hospital course:  Patient was admitted to the plastic surgery service and started on Abx. Patient went to the OR on 1/6 for Washout and closure of the abdominoplasty wound with drain placement. Patient progressed well overnight, tolerate diet, drain with minimal serosanguineous output adn is stable for DC home. Perry was educated on drain care.    Drain instructions:  Empty drains in in a cup  Measure output daily  After draining, squeeze the bulb and close to put it back to suction

## 2025-01-07 NOTE — DISCHARGE NOTE NURSING/CASE MANAGEMENT/SOCIAL WORK - NSDCPEFALRISK_GEN_ALL_CORE
For information on Fall & Injury Prevention, visit: [email or web address] OR  [email or web address] OR  https://www.cdc.gov/steadi/patient.html

## 2025-01-07 NOTE — DISCHARGE NOTE PROVIDER - NSDCCPCAREPLAN_GEN_ALL_CORE_FT
PRINCIPAL DISCHARGE DIAGNOSIS  Diagnosis: Open draining abdominal incision  Assessment and Plan of Treatment:

## 2025-01-07 NOTE — DISCHARGE NOTE NURSING/CASE MANAGEMENT/SOCIAL WORK - PATIENT PORTAL LINK FT
You can access the FollowMyHealth Patient Portal offered by [facility] by registering at the following website: [email or web address]. By joining [facility]’s FollowMyHealth portal, you will also be able to view your health information using other applications (apps) compatible with our system.

## 2025-01-07 NOTE — DISCHARGE NOTE PROVIDER - NSDCMRMEDTOKEN_GEN_ALL_CORE_FT
acetaminophen 325 mg oral tablet: 2 tab(s) orally every 6 hours  amLODIPine 10 mg oral tablet: 1 tab(s) orally once a day  amoxicillin-clavulanate 875 mg-125 mg oral tablet: 875 milligram(s) orally 2 times a day  ibuprofen 600 mg oral tablet: 1 tab(s) orally every 6 hours As needed Temp greater or equal to 38C (100.4F), Mild Pain (1 - 3)  Mounjaro 10 mg/0.5 mL subcutaneous solution: 10 milligram(s) subcutaneously once a week  Oxaydo 5 mg oral tablet: 1 tab(s) orally every 6 hours MDD: 4  potassium chloride: 1 tab(s) once a day  senna leaf extract oral tablet: 2 tab(s) orally once a day (at bedtime) As needed Constipation  valsartan-hydrochlorothiazide 160 mg-25 mg oral tablet: 1 tab(s) orally once a day   acetaminophen 325 mg oral tablet: 2 tab(s) orally every 6 hours  amLODIPine 10 mg oral tablet: 1 tab(s) orally once a day  amoxicillin-clavulanate 875 mg-125 mg oral tablet: 875 milligram(s) orally 2 times a day  ibuprofen 600 mg oral tablet: 1 tab(s) orally every 6 hours As needed Temp greater or equal to 38C (100.4F), Mild Pain (1 - 3)  Mounjaro 10 mg/0.5 mL subcutaneous solution: 10 milligram(s) subcutaneously once a week  oxyCODONE 5 mg oral capsule: 1 cap(s) orally every 6 hours MDD: 4  potassium chloride: 1 tab(s) once a day  senna leaf extract oral tablet: 2 tab(s) orally once a day (at bedtime) As needed Constipation  valsartan-hydrochlorothiazide 160 mg-25 mg oral tablet: 1 tab(s) orally once a day

## 2025-01-08 VITALS
DIASTOLIC BLOOD PRESSURE: 60 MMHG | TEMPERATURE: 98 F | RESPIRATION RATE: 18 BRPM | SYSTOLIC BLOOD PRESSURE: 98 MMHG | OXYGEN SATURATION: 93 % | HEART RATE: 80 BPM

## 2025-01-08 PROCEDURE — 84702 CHORIONIC GONADOTROPIN TEST: CPT

## 2025-01-08 PROCEDURE — 36415 COLL VENOUS BLD VENIPUNCTURE: CPT

## 2025-01-08 PROCEDURE — 82435 ASSAY OF BLOOD CHLORIDE: CPT

## 2025-01-08 PROCEDURE — 86901 BLOOD TYPING SEROLOGIC RH(D): CPT

## 2025-01-08 PROCEDURE — 80053 COMPREHEN METABOLIC PANEL: CPT

## 2025-01-08 PROCEDURE — 83605 ASSAY OF LACTIC ACID: CPT

## 2025-01-08 PROCEDURE — 85018 HEMOGLOBIN: CPT

## 2025-01-08 PROCEDURE — 85730 THROMBOPLASTIN TIME PARTIAL: CPT

## 2025-01-08 PROCEDURE — 85025 COMPLETE CBC W/AUTO DIFF WBC: CPT

## 2025-01-08 PROCEDURE — 84100 ASSAY OF PHOSPHORUS: CPT

## 2025-01-08 PROCEDURE — 96374 THER/PROPH/DIAG INJ IV PUSH: CPT

## 2025-01-08 PROCEDURE — 74177 CT ABD & PELVIS W/CONTRAST: CPT | Mod: MC

## 2025-01-08 PROCEDURE — 80202 ASSAY OF VANCOMYCIN: CPT

## 2025-01-08 PROCEDURE — 82330 ASSAY OF CALCIUM: CPT

## 2025-01-08 PROCEDURE — 99285 EMERGENCY DEPT VISIT HI MDM: CPT

## 2025-01-08 PROCEDURE — 85610 PROTHROMBIN TIME: CPT

## 2025-01-08 PROCEDURE — 93005 ELECTROCARDIOGRAM TRACING: CPT

## 2025-01-08 PROCEDURE — 86900 BLOOD TYPING SEROLOGIC ABO: CPT

## 2025-01-08 PROCEDURE — 82947 ASSAY GLUCOSE BLOOD QUANT: CPT

## 2025-01-08 PROCEDURE — 87040 BLOOD CULTURE FOR BACTERIA: CPT

## 2025-01-08 PROCEDURE — 80048 BASIC METABOLIC PNL TOTAL CA: CPT

## 2025-01-08 PROCEDURE — 85014 HEMATOCRIT: CPT

## 2025-01-08 PROCEDURE — 84132 ASSAY OF SERUM POTASSIUM: CPT

## 2025-01-08 PROCEDURE — 88304 TISSUE EXAM BY PATHOLOGIST: CPT

## 2025-01-08 PROCEDURE — 86850 RBC ANTIBODY SCREEN: CPT

## 2025-01-08 PROCEDURE — 96375 TX/PRO/DX INJ NEW DRUG ADDON: CPT

## 2025-01-08 PROCEDURE — C9399: CPT

## 2025-01-08 PROCEDURE — 84295 ASSAY OF SERUM SODIUM: CPT

## 2025-01-08 PROCEDURE — 83735 ASSAY OF MAGNESIUM: CPT

## 2025-01-08 PROCEDURE — 82803 BLOOD GASES ANY COMBINATION: CPT

## 2025-01-08 RX ORDER — SOD PHOS DI, MONO/K PHOS MONO 250 MG
1 TABLET ORAL ONCE
Refills: 0 | Status: COMPLETED | OUTPATIENT
Start: 2025-01-08 | End: 2025-01-08

## 2025-01-08 RX ADMIN — ACETAMINOPHEN 650 MILLIGRAM(S): 80 SOLUTION/ DROPS ORAL at 06:39

## 2025-01-08 RX ADMIN — VANCOMYCIN HYDROCHLORIDE 166.67 MILLIGRAM(S): 5 INJECTION, POWDER, LYOPHILIZED, FOR SOLUTION INTRAVENOUS at 05:39

## 2025-01-08 RX ADMIN — ACETAMINOPHEN 650 MILLIGRAM(S): 80 SOLUTION/ DROPS ORAL at 05:39

## 2025-01-08 RX ADMIN — ACETAMINOPHEN 650 MILLIGRAM(S): 80 SOLUTION/ DROPS ORAL at 00:04

## 2025-01-08 RX ADMIN — ENOXAPARIN SODIUM 40 MILLIGRAM(S): 60 INJECTION INTRAVENOUS; SUBCUTANEOUS at 05:39

## 2025-01-08 RX ADMIN — ACETAMINOPHEN 650 MILLIGRAM(S): 80 SOLUTION/ DROPS ORAL at 11:07

## 2025-01-08 RX ADMIN — Medication 10 MILLIGRAM(S): at 05:39

## 2025-01-08 RX ADMIN — Medication 1 PACKET(S): at 11:07

## 2025-01-08 RX ADMIN — VALSARTAN 160 MILLIGRAM(S): 80 TABLET ORAL at 05:39

## 2025-01-08 NOTE — PROGRESS NOTE ADULT - ASSESSMENT
49F s/p panniculectomy with abdominoplasty and flank liposuction on 12/27/2024 with Dr. Vega p/w surgical site infection.    Plan  - OR today for wash out  - NPO w IVF  - Ceftriaxone, vanc  - lovenox, SCDs  - Multimodal pain control
48 yo F s/p abdominloplasty washout and closure with drain placement doing well post-op.  Stable for DC this morning with drains.    Plan:  Discharge home  Pain control  Transition to PO abx  Stay bent at 45 degree while in bed, avoid tension to sutures.   Will follow with Dr. Vega on 1/9
50 yo F s/p abdominloplasty washout and closure with drain placement doing well post-op.  Stable for DC this morning with drains.    Plan:  Discharge home  Pain control  Transition to PO abx  Stay bent at 45 degree while in bed, avoid tension to sutures.   Will follow up with Dr. Vega tomorrow 1/9

## 2025-01-08 NOTE — PROGRESS NOTE ADULT - SUBJECTIVE AND OBJECTIVE BOX
INTERVAL HPI/OVERNIGHT EVENTS:    Patient evaluated at bedside. No acute distress.  S/P Washout overnight adn drain placement. Patient feeling well this morning and drains in place with SS output.     MEDICATIONS  (STANDING):  acetaminophen     Tablet .. 650 milliGRAM(s) Oral every 6 hours  amLODIPine   Tablet 10 milliGRAM(s) Oral daily  cefTRIAXone Injectable. 1000 milliGRAM(s) IV Push every 24 hours  enoxaparin Injectable 40 milliGRAM(s) SubCutaneous every 24 hours  hydrochlorothiazide 25 milliGRAM(s) Oral daily  influenza   Vaccine 0.5 milliLiter(s) IntraMuscular once  valsartan 160 milliGRAM(s) Oral daily  vancomycin  IVPB 1250 milliGRAM(s) IV Intermittent every 12 hours    MEDICATIONS  (PRN):  ibuprofen  Tablet. 600 milliGRAM(s) Oral every 6 hours PRN Temp greater or equal to 38C (100.4F), Mild Pain (1 - 3)  oxyCODONE    IR 5 milliGRAM(s) Oral every 4 hours PRN Moderate Pain (4 - 6)  oxyCODONE    IR 10 milliGRAM(s) Oral every 4 hours PRN Severe Pain (7 - 10)  senna 2 Tablet(s) Oral at bedtime PRN Constipation      Vital Signs Last 24 Hrs  T(C): 36.7 (07 Jan 2025 08:54), Max: 36.7 (07 Jan 2025 08:54)  T(F): 98 (07 Jan 2025 08:54), Max: 98 (07 Jan 2025 08:54)  HR: 80 (07 Jan 2025 08:54) (73 - 92)  BP: 94/60 (07 Jan 2025 08:54) (94/60 - 115/79)  BP(mean): --  RR: 18 (07 Jan 2025 08:54) (12 - 20)  SpO2: 92% (07 Jan 2025 08:54) (92% - 100%)    Parameters below as of 07 Jan 2025 08:54  Patient On (Oxygen Delivery Method): room air      PHYSICAL EXAM:  GENERAL: NAD  CHEST/LUNG: non-labored breathing on room air.   HEART: Regular rate and rhythm  ABDOMEN: Soft, Nondistended, incisional tenderness, dressings intact adn clean, BL DP drains with SS output.     I&O's Detail    06 Jan 2025 07:01  -  07 Jan 2025 07:00  --------------------------------------------------------  IN:  Total IN: 0 mL    OUT:    Bulb (mL): 30 mL    Bulb (mL): 30 mL    Voided (mL): 750 mL  Total OUT: 810 mL    Total NET: -810 mL          LABS:                        10.8   9.63  )-----------( 479      ( 07 Jan 2025 04:40 )             32.3     01-07    137  |  103  |  8.5  ----------------------------<  136[H]  3.7   |  22.0  |  0.63    Ca    7.8[L]      07 Jan 2025 04:40  Phos  3.6     01-07  Mg     2.2     01-07    TPro  5.2[L]  /  Alb  2.8[L]  /  TBili  0.2[L]  /  DBili  x   /  AST  9   /  ALT  10  /  AlkPhos  63  01-06    PT/INR - ( 05 Jan 2025 17:07 )   PT: 11.5 sec;   INR: 0.99 ratio         PTT - ( 05 Jan 2025 17:07 )  PTT:36.2 sec  Urinalysis Basic - ( 07 Jan 2025 04:40 )    Color: x / Appearance: x / SG: x / pH: x  Gluc: 136 mg/dL / Ketone: x  / Bili: x / Urobili: x   Blood: x / Protein: x / Nitrite: x   Leuk Esterase: x / RBC: x / WBC x   Sq Epi: x / Non Sq Epi: x / Bacteria: x        RADIOLOGY & ADDITIONAL STUDIES:
PLASTIC SURGERY      STATUS POST:  Abdominoplasty revision, debridement, washout    POST OPERATIVE DAY #2      INTERVAL EVENTS/SUBJECTIVE:   No acute events overnight. Ms. Rios reports her pain is currently well controlled.  Denies fevers or chills.  She feels prepared to go home today.    ______________________________________________  OBJECTIVE:   T(C): 36.8 (01-08-25 @ 05:00), Max: 37.1 (01-07-25 @ 20:20)  HR: 79 (01-08-25 @ 05:00) (72 - 87)  BP: 107/70 (01-08-25 @ 05:00) (94/60 - 107/70)  RR: 18 (01-08-25 @ 05:00) (18 - 18)  SpO2: 93% (01-08-25 @ 05:00) (92% - 93%)  Wt(kg): --  CAPILLARY BLOOD GLUCOSE        I&O's Detail    07 Jan 2025 07:01  -  08 Jan 2025 07:00  --------------------------------------------------------  IN:  Total IN: 0 mL    OUT:    Bulb (mL): 25 mL    Bulb (mL): 35 mL    Voided (mL): 1300 mL  Total OUT: 1360 mL    Total NET: -1360 mL          Physical exam:  Gen: resting in bed comfortably in NAD  Chest: no increased WOB, regular inspiratory effort   Abdomen: Soft, nontender, nondistended, and not guarding.  Dressings are c/d/i without strikethrough.  Vascular: ANGELICAP, ARNALDO x4  NEURO: awake, alert  ______________________________________________  LABS:                        10.8   9.63  )-----------( 479      ( 07 Jan 2025 04:40 )             32.3       01-07    137  |  103  |  8.5  ----------------------------<  136[H]  3.7   |  22.0  |  0.63    Ca    7.8[L]      07 Jan 2025 04:40  Phos  3.6     01-07  Mg     2.2     01-07      _____________________________________________      
Pt stable overnight.  No new complaints.   AVSS    Abdominal wound is stable.  Central dehiscence with serous drainage. Mild erythema.  No odor or purulence.  Small areas of fat necrosis.      A/P: Pt for OR today for washout, debridement and re-closure of abdominal wound  Discussed R/B/A including but not limited to bleeding infection, seroma/hematoma, wound breakdown, contour deformity, delayed healing, need for additional surgery.   She understands and agrees to proceed
PLASTIC SURGERY      INTERVAL EVENTS/SUBJECTIVE:   No acute events overnight. Pain is well controlled, no current complaints.    ______________________________________________  OBJECTIVE:   T(C): 36.7 (01-06-25 @ 08:09), Max: 36.8 (01-06-25 @ 00:17)  HR: 69 (01-06-25 @ 08:09) (69 - 105)  BP: 102/65 (01-06-25 @ 08:09) (102/65 - 124/70)  RR: 18 (01-06-25 @ 08:09) (18 - 20)  SpO2: 92% (01-06-25 @ 08:09) (91% - 98%)  Wt(kg): --  CAPILLARY BLOOD GLUCOSE        I&O's Detail    05 Jan 2025 07:01  -  06 Jan 2025 07:00  --------------------------------------------------------  IN:    sodium chloride 0.9%: 1440 mL  Total IN: 1440 mL    OUT:    Bulb (mL): 5 mL    Bulb (mL): 5 mL    Voided (mL): 2 mL  Total OUT: 12 mL    Total NET: 1428 mL          Physical exam:  Gen: resting in bed comfortably in NAD  Chest: no increased WOB, regular inspiratory effort   Abdomen: Soft, nontender, nondistended, lower abdominal incision with superficial discontinuity, no drainage or odor currently, the wound is erythematous and with inferior induration.  The umbilicus is erythematous with a small amount of serous drainage.  Vascular: WWP, ARNALDO x4  NEURO: awake, alert  ______________________________________________  LABS:                        11.4   8.00  )-----------( 458      ( 06 Jan 2025 04:44 )             33.6       01-06    139  |  107  |  9.9  ----------------------------<  78  4.2   |  21.0[L]  |  0.72    Ca    8.1[L]      06 Jan 2025 04:44    TPro  5.2[L]  /  Alb  2.8[L]  /  TBili  0.2[L]  /  DBili  x   /  AST  9   /  ALT  10  /  AlkPhos  63  01-06    _____________________________________________  RADIOLOGY:  < from: CT Abdomen and Pelvis w/ IV Cont (01.05.25 @ 19:13) >    ACC: [id number] EXAM:  CT ABDOMEN AND PELVIS IC   ORDERED BY: NATE CH     PROCEDURE DATE:  01/05/2025          INTERPRETATION:  CLINICAL INFORMATION: Abdominal pain after recent   panniculectomy    COMPARISON: None.    CONTRAST/COMPLICATIONS:  IV Contrast: Omnipaque 350  90 cc administered   10 cc discarded  Oral Contrast: NONE  .  PROCEDURE:  CT of the Abdomen and Pelvis was performed.  Sagittal and coronal reformats were performed.    FINDINGS:  LOWER CHEST: Clear.    LIVER: Normal.  BILE DUCTS: Nondilated.  GALLBLADDER: Contracted  SPLEEN: Normal.  PANCREAS: Normal.  ADRENALS: Normal.  KIDNEYS/URETERS: No calculi, hydronephrosis, or renal mass.    BLADDER: Underdistended limiting evaluation.  REPRODUCTIVE ORGANS: IUD in place. Fundal fibroid.    BOWEL: No bowel-related abnormality. No bowel obstruction or bowel   inflammation. Normal appendix and ileocecal region. No evidence of active   colitis or diverticulitis.  PERITONEUM: No free air or ascites.  VESSELS: Normal caliber aorta.  RETROPERITONEUM/LYMPH NODES: No adenopathy.  ABDOMINAL WALL: Status post abdominoplasty with the tip postsurgical   changes, soft tissue edema, subcutaneous air, and 2 drains in place. No   hematoma or drainable collection.  BONES: No acute bony abnormality.    IMPRESSION:  *  No acute pathology.  *  Postsurgical changes in the anterior abdominal wall without hematoma   or drainable collection.      --- End of Report ---            OCTAVIO YOST MD; Attending Radiologist  This document has been electronically signed. Jan 5 2025  7:44PM    < end of copied text >

## 2025-01-08 NOTE — PROGRESS NOTE ADULT - REASON FOR ADMISSION
SSTI, ?abscess s/p panniculectomy

## 2025-01-11 LAB
CULTURE RESULTS: SIGNIFICANT CHANGE UP
SPECIMEN SOURCE: SIGNIFICANT CHANGE UP

## 2025-01-24 LAB — SURGICAL PATHOLOGY STUDY: SIGNIFICANT CHANGE UP

## 2025-03-06 ENCOUNTER — NON-APPOINTMENT (OUTPATIENT)
Age: 50
End: 2025-03-06

## 2025-03-06 ENCOUNTER — APPOINTMENT (OUTPATIENT)
Dept: CARDIOLOGY | Facility: CLINIC | Age: 50
End: 2025-03-06
Payer: COMMERCIAL

## 2025-03-06 VITALS
DIASTOLIC BLOOD PRESSURE: 81 MMHG | OXYGEN SATURATION: 97 % | WEIGHT: 152 LBS | HEIGHT: 64 IN | BODY MASS INDEX: 25.95 KG/M2 | SYSTOLIC BLOOD PRESSURE: 121 MMHG | HEART RATE: 82 BPM

## 2025-03-06 DIAGNOSIS — R20.0 ANESTHESIA OF SKIN: ICD-10-CM

## 2025-03-06 DIAGNOSIS — R03.0 ELEVATED BLOOD-PRESSURE READING, W/OUT DIAGNOSIS OF HYPERTENSION: ICD-10-CM

## 2025-03-06 DIAGNOSIS — E66.01 MORBID (SEVERE) OBESITY DUE TO EXCESS CALORIES: ICD-10-CM

## 2025-03-06 DIAGNOSIS — G47.33 OBSTRUCTIVE SLEEP APNEA (ADULT) (PEDIATRIC): ICD-10-CM

## 2025-03-06 DIAGNOSIS — Z00.00 ENCOUNTER FOR GENERAL ADULT MEDICAL EXAMINATION W/OUT ABNORMAL FINDINGS: ICD-10-CM

## 2025-03-06 DIAGNOSIS — Z01.810 ENCOUNTER FOR PREPROCEDURAL CARDIOVASCULAR EXAMINATION: ICD-10-CM

## 2025-03-06 DIAGNOSIS — R06.09 OTHER FORMS OF DYSPNEA: ICD-10-CM

## 2025-03-06 DIAGNOSIS — R07.89 OTHER CHEST PAIN: ICD-10-CM

## 2025-03-06 DIAGNOSIS — I10 ESSENTIAL (PRIMARY) HYPERTENSION: ICD-10-CM

## 2025-03-06 PROCEDURE — 93000 ELECTROCARDIOGRAM COMPLETE: CPT

## 2025-03-06 PROCEDURE — 99215 OFFICE O/P EST HI 40 MIN: CPT

## 2025-03-06 PROCEDURE — G2211 COMPLEX E/M VISIT ADD ON: CPT | Mod: NC

## 2025-03-06 RX ORDER — TIRZEPATIDE 7.5 MG/.5ML
7.5 INJECTION, SOLUTION SUBCUTANEOUS
Qty: 4 | Refills: 2 | Status: ACTIVE | COMMUNITY
Start: 2025-03-06 | End: 1900-01-01

## 2025-03-07 RX ORDER — TIRZEPATIDE 7.5 MG/.5ML
7.5 INJECTION, SOLUTION SUBCUTANEOUS WEEKLY
Qty: 4 | Refills: 3 | Status: ACTIVE | COMMUNITY
Start: 2025-03-07 | End: 1900-01-01

## 2025-04-01 ENCOUNTER — RX RENEWAL (OUTPATIENT)
Age: 50
End: 2025-04-01

## 2025-04-11 ENCOUNTER — EMERGENCY (EMERGENCY)
Facility: HOSPITAL | Age: 50
LOS: 1 days | End: 2025-04-11
Attending: STUDENT IN AN ORGANIZED HEALTH CARE EDUCATION/TRAINING PROGRAM
Payer: COMMERCIAL

## 2025-04-11 VITALS
RESPIRATION RATE: 20 BRPM | HEIGHT: 64 IN | WEIGHT: 190.04 LBS | TEMPERATURE: 98 F | OXYGEN SATURATION: 98 % | HEART RATE: 123 BPM | DIASTOLIC BLOOD PRESSURE: 72 MMHG | SYSTOLIC BLOOD PRESSURE: 125 MMHG

## 2025-04-11 DIAGNOSIS — Z98.890 OTHER SPECIFIED POSTPROCEDURAL STATES: Chronic | ICD-10-CM

## 2025-04-11 LAB
ALBUMIN SERPL ELPH-MCNC: 4.1 G/DL — SIGNIFICANT CHANGE UP (ref 3.3–5.2)
ALP SERPL-CCNC: 58 U/L — SIGNIFICANT CHANGE UP (ref 40–120)
ALT FLD-CCNC: 12 U/L — SIGNIFICANT CHANGE UP
ANION GAP SERPL CALC-SCNC: 16 MMOL/L — SIGNIFICANT CHANGE UP (ref 5–17)
ANION GAP SERPL CALC-SCNC: 18 MMOL/L — HIGH (ref 5–17)
APAP SERPL-MCNC: <3 UG/ML — LOW (ref 10–26)
AST SERPL-CCNC: 21 U/L — SIGNIFICANT CHANGE UP
BASOPHILS # BLD AUTO: 0.03 K/UL — SIGNIFICANT CHANGE UP (ref 0–0.2)
BASOPHILS NFR BLD AUTO: 0.3 % — SIGNIFICANT CHANGE UP (ref 0–2)
BILIRUB SERPL-MCNC: 0.5 MG/DL — SIGNIFICANT CHANGE UP (ref 0.4–2)
BUN SERPL-MCNC: 10.6 MG/DL — SIGNIFICANT CHANGE UP (ref 8–20)
BUN SERPL-MCNC: 12.3 MG/DL — SIGNIFICANT CHANGE UP (ref 8–20)
CALCIUM SERPL-MCNC: 8.7 MG/DL — SIGNIFICANT CHANGE UP (ref 8.4–10.5)
CALCIUM SERPL-MCNC: 9.1 MG/DL — SIGNIFICANT CHANGE UP (ref 8.4–10.5)
CHLORIDE SERPL-SCNC: 97 MMOL/L — SIGNIFICANT CHANGE UP (ref 96–108)
CHLORIDE SERPL-SCNC: 99 MMOL/L — SIGNIFICANT CHANGE UP (ref 96–108)
CO2 SERPL-SCNC: 21 MMOL/L — LOW (ref 22–29)
CO2 SERPL-SCNC: 22 MMOL/L — SIGNIFICANT CHANGE UP (ref 22–29)
CREAT SERPL-MCNC: 0.58 MG/DL — SIGNIFICANT CHANGE UP (ref 0.5–1.3)
CREAT SERPL-MCNC: 0.61 MG/DL — SIGNIFICANT CHANGE UP (ref 0.5–1.3)
EGFR: 109 ML/MIN/1.73M2 — SIGNIFICANT CHANGE UP
EGFR: 109 ML/MIN/1.73M2 — SIGNIFICANT CHANGE UP
EGFR: 110 ML/MIN/1.73M2 — SIGNIFICANT CHANGE UP
EGFR: 110 ML/MIN/1.73M2 — SIGNIFICANT CHANGE UP
EOSINOPHIL # BLD AUTO: 0.01 K/UL — SIGNIFICANT CHANGE UP (ref 0–0.5)
EOSINOPHIL NFR BLD AUTO: 0.1 % — SIGNIFICANT CHANGE UP (ref 0–6)
ETHANOL SERPL-MCNC: <10 MG/DL — SIGNIFICANT CHANGE UP (ref 0–9)
GAS PNL BLDV: SIGNIFICANT CHANGE UP
GAS PNL BLDV: SIGNIFICANT CHANGE UP
GLUCOSE SERPL-MCNC: 78 MG/DL — SIGNIFICANT CHANGE UP (ref 70–99)
GLUCOSE SERPL-MCNC: 79 MG/DL — SIGNIFICANT CHANGE UP (ref 70–99)
HCG SERPL-ACNC: <4 MIU/ML — SIGNIFICANT CHANGE UP
HCT VFR BLD CALC: 42.6 % — SIGNIFICANT CHANGE UP (ref 34.5–45)
HGB BLD-MCNC: 15 G/DL — SIGNIFICANT CHANGE UP (ref 11.5–15.5)
IMM GRANULOCYTES # BLD AUTO: 0.03 K/UL — SIGNIFICANT CHANGE UP (ref 0–0.07)
IMM GRANULOCYTES NFR BLD AUTO: 0.3 % — SIGNIFICANT CHANGE UP (ref 0–0.9)
LYMPHOCYTES # BLD AUTO: 2.02 K/UL — SIGNIFICANT CHANGE UP (ref 1–3.3)
LYMPHOCYTES NFR BLD AUTO: 17.9 % — SIGNIFICANT CHANGE UP (ref 13–44)
MAGNESIUM SERPL-MCNC: 1.8 MG/DL — SIGNIFICANT CHANGE UP (ref 1.6–2.6)
MCHC RBC-ENTMCNC: 31.8 PG — SIGNIFICANT CHANGE UP (ref 27–34)
MCHC RBC-ENTMCNC: 35.2 G/DL — SIGNIFICANT CHANGE UP (ref 32–36)
MCV RBC AUTO: 90.3 FL — SIGNIFICANT CHANGE UP (ref 80–100)
MONOCYTES # BLD AUTO: 0.5 K/UL — SIGNIFICANT CHANGE UP (ref 0–0.9)
MONOCYTES NFR BLD AUTO: 4.4 % — SIGNIFICANT CHANGE UP (ref 2–14)
NEUTROPHILS # BLD AUTO: 8.7 K/UL — HIGH (ref 1.8–7.4)
NEUTROPHILS NFR BLD AUTO: 77 % — SIGNIFICANT CHANGE UP (ref 43–77)
NRBC # BLD AUTO: 0 K/UL — SIGNIFICANT CHANGE UP (ref 0–0)
NRBC # FLD: 0 K/UL — SIGNIFICANT CHANGE UP (ref 0–0)
NRBC BLD AUTO-RTO: 0 /100 WBCS — SIGNIFICANT CHANGE UP (ref 0–0)
PLATELET # BLD AUTO: 367 K/UL — SIGNIFICANT CHANGE UP (ref 150–400)
PMV BLD: 10 FL — SIGNIFICANT CHANGE UP (ref 7–13)
POTASSIUM SERPL-MCNC: 3.1 MMOL/L — LOW (ref 3.5–5.3)
POTASSIUM SERPL-MCNC: 3.2 MMOL/L — LOW (ref 3.5–5.3)
POTASSIUM SERPL-SCNC: 3.1 MMOL/L — LOW (ref 3.5–5.3)
POTASSIUM SERPL-SCNC: 3.2 MMOL/L — LOW (ref 3.5–5.3)
PROT SERPL-MCNC: 7.1 G/DL — SIGNIFICANT CHANGE UP (ref 6.6–8.7)
RBC # BLD: 4.72 M/UL — SIGNIFICANT CHANGE UP (ref 3.8–5.2)
RBC # FLD: 11.9 % — SIGNIFICANT CHANGE UP (ref 10.3–14.5)
SALICYLATES SERPL-MCNC: <0.6 MG/DL — LOW (ref 10–20)
SODIUM SERPL-SCNC: 136 MMOL/L — SIGNIFICANT CHANGE UP (ref 135–145)
SODIUM SERPL-SCNC: 137 MMOL/L — SIGNIFICANT CHANGE UP (ref 135–145)
TSH SERPL-MCNC: 2.51 UIU/ML — SIGNIFICANT CHANGE UP (ref 0.27–4.2)
WBC # BLD: 11.29 K/UL — HIGH (ref 3.8–10.5)
WBC # FLD AUTO: 11.29 K/UL — HIGH (ref 3.8–10.5)

## 2025-04-11 PROCEDURE — 82435 ASSAY OF BLOOD CHLORIDE: CPT

## 2025-04-11 PROCEDURE — 99285 EMERGENCY DEPT VISIT HI MDM: CPT

## 2025-04-11 PROCEDURE — 83605 ASSAY OF LACTIC ACID: CPT

## 2025-04-11 PROCEDURE — 71045 X-RAY EXAM CHEST 1 VIEW: CPT

## 2025-04-11 PROCEDURE — 80048 BASIC METABOLIC PNL TOTAL CA: CPT

## 2025-04-11 PROCEDURE — 74177 CT ABD & PELVIS W/CONTRAST: CPT | Mod: 26

## 2025-04-11 PROCEDURE — 36415 COLL VENOUS BLD VENIPUNCTURE: CPT

## 2025-04-11 PROCEDURE — 85025 COMPLETE CBC W/AUTO DIFF WBC: CPT

## 2025-04-11 PROCEDURE — 74177 CT ABD & PELVIS W/CONTRAST: CPT | Mod: MC

## 2025-04-11 PROCEDURE — 83735 ASSAY OF MAGNESIUM: CPT

## 2025-04-11 PROCEDURE — 71045 X-RAY EXAM CHEST 1 VIEW: CPT | Mod: 26

## 2025-04-11 PROCEDURE — 96375 TX/PRO/DX INJ NEW DRUG ADDON: CPT

## 2025-04-11 PROCEDURE — 82803 BLOOD GASES ANY COMBINATION: CPT

## 2025-04-11 PROCEDURE — 82330 ASSAY OF CALCIUM: CPT

## 2025-04-11 PROCEDURE — 82947 ASSAY GLUCOSE BLOOD QUANT: CPT

## 2025-04-11 PROCEDURE — 84295 ASSAY OF SERUM SODIUM: CPT

## 2025-04-11 PROCEDURE — 96374 THER/PROPH/DIAG INJ IV PUSH: CPT | Mod: XU

## 2025-04-11 PROCEDURE — 93005 ELECTROCARDIOGRAM TRACING: CPT

## 2025-04-11 PROCEDURE — 84132 ASSAY OF SERUM POTASSIUM: CPT

## 2025-04-11 PROCEDURE — 99285 EMERGENCY DEPT VISIT HI MDM: CPT | Mod: 25

## 2025-04-11 PROCEDURE — 85018 HEMOGLOBIN: CPT

## 2025-04-11 PROCEDURE — 84702 CHORIONIC GONADOTROPIN TEST: CPT

## 2025-04-11 PROCEDURE — 80307 DRUG TEST PRSMV CHEM ANLYZR: CPT

## 2025-04-11 PROCEDURE — 93010 ELECTROCARDIOGRAM REPORT: CPT

## 2025-04-11 PROCEDURE — 84443 ASSAY THYROID STIM HORMONE: CPT

## 2025-04-11 PROCEDURE — 85014 HEMATOCRIT: CPT

## 2025-04-11 PROCEDURE — 80053 COMPREHEN METABOLIC PANEL: CPT

## 2025-04-11 RX ORDER — LORAZEPAM 4 MG/ML
2 VIAL (ML) INJECTION ONCE
Refills: 0 | Status: DISCONTINUED | OUTPATIENT
Start: 2025-04-11 | End: 2025-04-11

## 2025-04-11 RX ADMIN — Medication 1000 MILLILITER(S): at 20:55

## 2025-04-11 RX ADMIN — Medication 40 MILLIEQUIVALENT(S): at 23:56

## 2025-04-11 RX ADMIN — Medication 2 MILLIGRAM(S): at 18:26

## 2025-04-11 RX ADMIN — Medication 40 MILLIEQUIVALENT(S): at 20:55

## 2025-04-11 RX ADMIN — Medication 100 MILLIEQUIVALENT(S): at 20:55

## 2025-04-11 NOTE — ED PROVIDER NOTE - OBJECTIVE STATEMENT
A 51 yo F with pmh HTN, DVT, abdominoplasty in January 2025, presents to the ED for anxiety, tachycardic started this morning. Also reports tingling sensation in the upper extremities and jaw clenching. Took Xanax 0.25 and hydroxyzine with minimal relief. Denies SI/HI. Denies fevers, chills, headache, chest pain, shortness of breath, cough, nausea, vomiting, diarrhea, hematuria, dysuria, dark stools, focal neurologic symptoms.

## 2025-04-11 NOTE — ED PROVIDER NOTE - CLINICAL SUMMARY MEDICAL DECISION MAKING FREE TEXT BOX
Given ativan for anxiety. R/O electrolyte abnormality, thyroid, infection, or post-surgical changes. Will check cbc, cmp, CXR, CT ab/p with con. Check Tox.  Given IVF, reassess.

## 2025-04-11 NOTE — ED PROVIDER NOTE - PHYSICAL EXAMINATION
Luz Noel MD Attending  GEN: Patient awake alert NAD.   HEENT: normocephalic, atraumatic, EOMI, no scleral icterus, grinding teeth  CARDIAC: RRR, S1, S2, no murmur.   PULM: CTA B/L no wheeze, rhonchi, rales.   ABD: superficial soft tissue lumps, sp abdominoplasty, non tender, non distended.   MSK: Moving all extremities, no edema. 5/5 strength and full ROM in all extremities.     NEURO: A&Ox3, gait normal, no focal neurological deficits, CN 2-12 grossly intact  SKIN: warm, dry, no rash.  psych: anxious

## 2025-04-11 NOTE — ED ADULT NURSE REASSESSMENT NOTE - NS ED NURSE REASSESS COMMENT FT1
Care of pt assumed from day RN. Patient a&ox3, no acute distress, resp nonlabored, resting comfortably in bed with family at bedside. Denies headache, dizziness, chest pain, palpitations, SOB, abd pain, n/v/d, urinary symptoms, fevers, chills, weakness at this time. Patient awaiting CT scan. Safety maintained.

## 2025-04-11 NOTE — ED PROVIDER NOTE - NSFOLLOWUPINSTRUCTIONS_ED_ALL_ED_FT
** Follow up with your psychiatrist. and primary care doctor in the next 72 hours.     ** Go to the nearest Emergency Department if you experience any new or concerning symptoms, such as:   - worsening pain  - chest pain  - difficulty breathing  - passing out  - unable to eat or drink  - unable to move or feel part of your body  - fever, chills

## 2025-04-11 NOTE — ED ADULT NURSE NOTE - OBJECTIVE STATEMENT
pt c/o of anxiety attack, stated that she 's going through a rough divorce,  still lives with her and she's getting irritated. stated that she has some jaw pain from grinding her teeth. no cp or sob. no hx. took a xanax and hydroxyzine.

## 2025-04-11 NOTE — ED PROVIDER NOTE - PATIENT PORTAL LINK FT
You can access the FollowMyHealth Patient Portal offered by [facility] by registering at the following website: [email or web address]. By joining [email or web address] [email or web address] portal, you will also be able to view your health information using other applications (apps) compatible with our system.

## 2025-04-11 NOTE — ED PROVIDER NOTE - ATTENDING CONTRIBUTION TO CARE
---------  Luz Noel MD, Attending  A 49 yo F with pmh HTN, DVT, abdominoplasty in January 2025, presents to the ED for anxiety, tachycardic started this morning. Also reports tingling sensation in the upper extremities and jaw clenching. Took Xanax 0.25 and hydroxyzine with minimal relief. Pt endorse under significant stress due to  losing well paying job and on-ongoing divorce. Denies SI/HI. Denies fevers, chills, headache, chest pain, shortness of breath, cough, nausea, vomiting, diarrhea, hematuria, dysuria, dark stools, focal neurologic symptoms. Pt states she usually gets better with xanax, but did not today.     Pt complains of abd "stiffness", states there are  lumps in her abdomen that were not present before. Pt recounts failed abd surgery, leading to multiple corrective surgeries.     GEN: Patient awake alert NAD.   HEENT: normocephalic, atraumatic, EOMI, no scleral icterus, grinding teeth  CARDIAC: RRR, S1, S2, no murmur.   PULM: CTA B/L no wheeze, rhonchi, rales.   ABD: superficial soft tissue lumps, sp abdominoplasty, non tender, non distended.   MSK: Moving all extremities, no edema. 5/5 strength and full ROM in all extremities.     NEURO: A&Ox3, gait normal, no focal neurological deficits, CN 2-12 grossly intact  SKIN: warm, dry, no rash.  psych: anxious  ddx includes, but is not limited to the following: anxiety, electrolyte derangement, anemia, post surgical changes, lower suspicion for intraabdominal infection/ collection given non tender.   plan: screening labs, CT abdomen pelvis, anxiolytic,   update: Pt states she has no rider to go home, is declining taxi/ uber "because she feels unsafe". Continues to call  with whom she is currently  to come pick her up, no answer. called ex to pick her up on ED phone, no answer  update: notified that Pt went to the bathroom and ex  showed up and pt left with ex , per RN, IV removed earlier, did not received dc papers.

## 2025-04-11 NOTE — ED ADULT TRIAGE NOTE - CHIEF COMPLAINT QUOTE
Arrives c/o anxiety. Pt states "I'm going through a divorce so I just started taking xanax and hydroxyzine". Pt states she called EMS earlier due to anxiety but tried taking meds so called a second time. Denies difficulty breathing. Denies SI/HI.

## 2025-04-12 VITALS
DIASTOLIC BLOOD PRESSURE: 83 MMHG | TEMPERATURE: 99 F | OXYGEN SATURATION: 97 % | HEART RATE: 93 BPM | SYSTOLIC BLOOD PRESSURE: 143 MMHG

## 2025-04-12 LAB — GAS PNL BLDV: SIGNIFICANT CHANGE UP

## 2025-06-19 ENCOUNTER — APPOINTMENT (OUTPATIENT)
Dept: CARDIOLOGY | Facility: CLINIC | Age: 50
End: 2025-06-19
Payer: COMMERCIAL

## 2025-06-19 ENCOUNTER — NON-APPOINTMENT (OUTPATIENT)
Age: 50
End: 2025-06-19

## 2025-06-19 VITALS
OXYGEN SATURATION: 97 % | HEART RATE: 95 BPM | DIASTOLIC BLOOD PRESSURE: 94 MMHG | HEIGHT: 64 IN | SYSTOLIC BLOOD PRESSURE: 122 MMHG

## 2025-06-19 PROCEDURE — 93000 ELECTROCARDIOGRAM COMPLETE: CPT

## 2025-06-19 PROCEDURE — 99214 OFFICE O/P EST MOD 30 MIN: CPT | Mod: 25

## 2025-08-04 NOTE — ED ADULT NURSE NOTE - CAS EDN DISCHARGE INTERVENTIONS
Called patient. I have gotten her an appointment in October. I have also sent a message to Dr. BARNES regarding this,    IV discontinued, cath removed intact

## (undated) DEVICE — KIT DEFENDO 4 OLY 4 PC

## (undated) DEVICE — CANISTER W/GEL INFOVAC 500ML

## (undated) DEVICE — DRAPE 3/4 SHEET 52X76"

## (undated) DEVICE — ZIMMER PULSAVAC PLUS FAN KIT

## (undated) DEVICE — DRAPE 1/2 SHEET 40X57"

## (undated) DEVICE — APPLICATOR ESWAB 1ML LIQUID AMIES REG

## (undated) DEVICE — PREP BETADINE KIT

## (undated) DEVICE — ELCTR ROCKER SWITCH PENCIL BLUE 10FT

## (undated) DEVICE — SOL IRR BAG NS 0.9% 3000ML

## (undated) DEVICE — DRSG VAC GRANUFOAM LARGE (BLACK)

## (undated) DEVICE — CANISTER W/GEL INFOVAC 1000ML

## (undated) DEVICE — BLADE SURGICAL #15 CARBON

## (undated) DEVICE — GLV 7 PROTEXIS (WHITE)

## (undated) DEVICE — VENODYNE/SCD SLEEVE CALF MEDIUM

## (undated) DEVICE — ELCTR GROUNDING PAD ADULT COVIDIEN

## (undated) DEVICE — PACK EXTREMITY

## (undated) DEVICE — SOL IRR POUR H2O 1000ML

## (undated) DEVICE — DRSG VAC GRANUFOAM MEDIUM (BLACK)

## (undated) DEVICE — WARMING BLANKET FULL ADULT